# Patient Record
Sex: MALE | Race: WHITE | NOT HISPANIC OR LATINO | Employment: FULL TIME | URBAN - METROPOLITAN AREA
[De-identification: names, ages, dates, MRNs, and addresses within clinical notes are randomized per-mention and may not be internally consistent; named-entity substitution may affect disease eponyms.]

---

## 2017-05-05 ENCOUNTER — HOSPITAL ENCOUNTER (OUTPATIENT)
Dept: GENERAL RADIOLOGY | Facility: CLINIC | Age: 27
Discharge: HOME | End: 2017-05-05
Attending: OTOLARYNGOLOGY | Admitting: OTOLARYNGOLOGY
Payer: COMMERCIAL

## 2017-05-05 DIAGNOSIS — R07.0 THROAT PAIN: ICD-10-CM

## 2017-05-05 PROCEDURE — 74220 X-RAY XM ESOPHAGUS 1CNTRST: CPT

## 2017-05-05 PROCEDURE — 25500045 ZZH RX 255: Performed by: OTOLARYNGOLOGY

## 2017-05-05 RX ADMIN — ANTACID/ANTIFLATULENT 4 G: 380; 550; 10; 10 GRANULE, EFFERVESCENT ORAL at 09:29

## 2020-01-29 ENCOUNTER — NURSE TRIAGE (OUTPATIENT)
Dept: NURSING | Facility: CLINIC | Age: 30
End: 2020-01-29

## 2020-01-29 ENCOUNTER — HOSPITAL ENCOUNTER (EMERGENCY)
Facility: CLINIC | Age: 30
Discharge: HOME | End: 2020-01-30
Attending: EMERGENCY MEDICINE | Admitting: EMERGENCY MEDICINE
Payer: COMMERCIAL

## 2020-01-29 ENCOUNTER — APPOINTMENT (OUTPATIENT)
Dept: MRI IMAGING | Facility: CLINIC | Age: 30
End: 2020-01-29
Attending: EMERGENCY MEDICINE
Payer: COMMERCIAL

## 2020-01-29 VITALS
DIASTOLIC BLOOD PRESSURE: 88 MMHG | OXYGEN SATURATION: 100 % | TEMPERATURE: 97.2 F | HEART RATE: 58 BPM | SYSTOLIC BLOOD PRESSURE: 139 MMHG | RESPIRATION RATE: 16 BRPM

## 2020-01-29 DIAGNOSIS — G43.109 MIGRAINE WITH AURA AND WITHOUT STATUS MIGRAINOSUS, NOT INTRACTABLE: ICD-10-CM

## 2020-01-29 LAB
ANION GAP SERPL CALCULATED.3IONS-SCNC: 2 MMOL/L (ref 3–14)
BASOPHILS # BLD AUTO: 0 10E9/L (ref 0–0.2)
BASOPHILS NFR BLD AUTO: 0.4 %
BUN SERPL-MCNC: 15 MG/DL (ref 7–30)
CALCIUM SERPL-MCNC: 9.6 MG/DL (ref 8.5–10.1)
CHLORIDE SERPL-SCNC: 106 MMOL/L (ref 94–109)
CO2 SERPL-SCNC: 30 MMOL/L (ref 20–32)
CREAT SERPL-MCNC: 1.03 MG/DL (ref 0.66–1.25)
DIFFERENTIAL METHOD BLD: NORMAL
EOSINOPHIL # BLD AUTO: 0.1 10E9/L (ref 0–0.7)
EOSINOPHIL NFR BLD AUTO: 0.9 %
ERYTHROCYTE [DISTWIDTH] IN BLOOD BY AUTOMATED COUNT: 11.9 % (ref 10–15)
GFR SERPL CREATININE-BSD FRML MDRD: >90 ML/MIN/{1.73_M2}
GLUCOSE SERPL-MCNC: 93 MG/DL (ref 70–99)
HCT VFR BLD AUTO: 45.6 % (ref 40–53)
HGB BLD-MCNC: 15.5 G/DL (ref 13.3–17.7)
IMM GRANULOCYTES # BLD: 0 10E9/L (ref 0–0.4)
IMM GRANULOCYTES NFR BLD: 0.1 %
LYMPHOCYTES # BLD AUTO: 1.6 10E9/L (ref 0.8–5.3)
LYMPHOCYTES NFR BLD AUTO: 22.5 %
MCH RBC QN AUTO: 28.8 PG (ref 26.5–33)
MCHC RBC AUTO-ENTMCNC: 34 G/DL (ref 31.5–36.5)
MCV RBC AUTO: 85 FL (ref 78–100)
MONOCYTES # BLD AUTO: 0.6 10E9/L (ref 0–1.3)
MONOCYTES NFR BLD AUTO: 9.3 %
NEUTROPHILS # BLD AUTO: 4.6 10E9/L (ref 1.6–8.3)
NEUTROPHILS NFR BLD AUTO: 66.8 %
NRBC # BLD AUTO: 0 10*3/UL
NRBC BLD AUTO-RTO: 0 /100
PLATELET # BLD AUTO: 302 10E9/L (ref 150–450)
POTASSIUM SERPL-SCNC: 3.8 MMOL/L (ref 3.4–5.3)
RBC # BLD AUTO: 5.38 10E12/L (ref 4.4–5.9)
SODIUM SERPL-SCNC: 138 MMOL/L (ref 133–144)
WBC # BLD AUTO: 6.9 10E9/L (ref 4–11)

## 2020-01-29 PROCEDURE — 99285 EMERGENCY DEPT VISIT HI MDM: CPT | Mod: 25

## 2020-01-29 PROCEDURE — A9585 GADOBUTROL INJECTION: HCPCS | Performed by: EMERGENCY MEDICINE

## 2020-01-29 PROCEDURE — 70553 MRI BRAIN STEM W/O & W/DYE: CPT

## 2020-01-29 PROCEDURE — 80048 BASIC METABOLIC PNL TOTAL CA: CPT | Performed by: EMERGENCY MEDICINE

## 2020-01-29 PROCEDURE — 85025 COMPLETE CBC W/AUTO DIFF WBC: CPT | Performed by: EMERGENCY MEDICINE

## 2020-01-29 PROCEDURE — 25500064 ZZH RX 255 OP 636: Performed by: EMERGENCY MEDICINE

## 2020-01-29 RX ORDER — GADOBUTROL 604.72 MG/ML
10 INJECTION INTRAVENOUS ONCE
Status: COMPLETED | OUTPATIENT
Start: 2020-01-29 | End: 2020-01-30

## 2020-01-29 ASSESSMENT — ENCOUNTER SYMPTOMS
SPEECH DIFFICULTY: 0
NAUSEA: 0
NUMBNESS: 0
VOMITING: 0
HEADACHES: 1

## 2020-01-29 NOTE — ED AVS SNAPSHOT
[facility] Emergency Department  [address]  [facility] [address]  Phone:  [phone]  Fax:  [phone]                                    Kannan Grijalva   MRN: [id number]    Department:  [facility] Emergency Department   Date of Visit:  1/29/2020           After Visit Summary Signature Page    I have received my discharge instructions, and my questions have been answered. I have discussed any challenges I see with this plan with the nurse or doctor.    ..........................................................................................................................................  Patient/Patient Representative Signature      ..........................................................................................................................................  Patient Representative Print Name and Relationship to Patient    ..................................................               ................................................  Date                                   Time    ..........................................................................................................................................  Reviewed by Signature/Title    ...................................................              ..............................................  Date                                               Time          22EPIC Rev 08/18

## 2020-01-30 PROCEDURE — A9585 GADOBUTROL INJECTION: HCPCS | Performed by: EMERGENCY MEDICINE

## 2020-01-30 PROCEDURE — 25500064 ZZH RX 255 OP 636: Performed by: EMERGENCY MEDICINE

## 2020-01-30 RX ADMIN — GADOBUTROL 8 ML: 604.72 INJECTION INTRAVENOUS at 00:19

## 2020-01-30 NOTE — TELEPHONE ENCOUNTER
"\"I had tunnel vision on my right eye when driving home\".  That has cleared, but then when trying to read when he got home he had trouble reading 10-15 minutes, then that cleared.    Reviewed sxs to call 911.    Reason for Disposition    Patient sounds very sick or weak to the triager    Additional Information    Negative: Weakness of the face, arm or leg on one side of the body    Negative: Followed getting substance in the eye    Negative: Foreign body or object is or was lodged in the eye    Negative: Followed an eye injury    Negative: Followed sun lamp or sun exposure (UV keratitis)    Negative: Yellow or green discharge (pus) in the eye    Negative: Pregnant    Negative: Postpartum    Negative: Complete loss of vision in 1 or both eyes    Negative: Severe eye pain    Negative: Severe headache    Negative: Double vision    Negative: [1] Blurred vision or visual changes AND [2] present now AND [3] sudden onset or new (e.g., minutes, hours, days)  (Exception: seeing floaters / black specks OR previously diagnosed migraine headaches with same symptoms)    Protocols used: VISION LOSS OR CHANGE-SAROJ-    Yulissa Estevez RN  [facility]      "

## 2020-01-30 NOTE — ED TRIAGE NOTES
Pt in to ER with C/O blurred vision headache and confusion onset at 1615. Pt reports sx resolved around 1730. Pt A&Ox4 ABCD's intact,  strength equal on exam in triage. Pt reports at the time of onset of sx he was having difficulty composing an e-mail. Sx are resolved at this time. Speech clear. Pt denies hx of migraines in the past. Pt denies weakness

## 2020-01-30 NOTE — ED PROVIDER NOTES
"  History     Chief Complaint:  Eye Problem and Headache    HPI   Kannan Grijalva is a 29 year old male who presents for evaluation of a headache and an eye problem. The patient reports seeing a dot in his left visual field while driving around 1615. He states this progressively worsened until he lost all peripheral vision on his left side for about 10 minutes before slowly subsiding. About 15 minutes after the symptoms subsided, he noted he was composing an e-mail and was unable to read or sound out words, which lasted about 15 minutes. He states his vision was about 75% back at this point and was still able to ambulate. He could not tell if he was slurring his speech, but his wife thought that he sounded normal over the phone. The patient also notes the visual symptoms subsided around 1730, but then he developed a mild headache shortly after. The patient states his left hand \"felt weird,\" but does not describe it has numbness or tingling. No nausea or vomiting. The patient does not frequently get headaches and denies a history of migraines. No drug use. He endorses stress at work recently and notes he was unable to eat throughout the day due to his busy schedule. The patient also indicates he decreased sleep as he has a 5 month old child at home.    Allergies:  NKDA     Medications:    The patient is not currently taking any prescribed medications.      Past Medical History:    Hernia     Past Surgical History:    Septoplasty   Hernia repair  Subtalar arthroereisis     Family History:    Father: hyperlipidemia   Sister: hypertension     Social History:  The patient was accompanied to the ED by his wife.  Smoking Status: Never Smoker  Smokeless Tobacco: Never Used  Alcohol Use: Positive  Marital Status:   [2]     Review of Systems   Eyes: Positive for visual disturbance.   Gastrointestinal: Negative for nausea and vomiting.   Neurological: Positive for headaches. Negative for speech difficulty and numbness. "        Reading difficulty  Denies tingling   All other systems reviewed and are negative.      Physical Exam     Patient Vitals for the past 24 hrs:   BP Temp Pulse Resp SpO2   01/29/20 2240 -- -- -- -- 100 %   01/29/20 2239 139/88 -- 58 -- --   01/29/20 2109 -- -- -- -- 97 %   01/29/20 2108 -- -- 87 -- --   01/29/20 1940 (!) 141/103 97.2  F (36.2  C) 101 16 --        Physical Exam  Nursing note and vitals reviewed.  Constitutional: Cooperative.   HENT:   Mouth/Throat: Moist mucous membranes.   Eyes: EOMI, nonicteric sclera  Cardiovascular: Normal rate, regular rhythm, no murmurs, rubs, or gallops  Pulmonary/Chest: Effort normal and breath sounds normal. No respiratory distress. No wheezes. No rales.   Abdominal: Soft. Nontender, nondistended, no guarding or rigidity. BS present.   Musculoskeletal: Normal range of motion.   Neurological: Alert. Moves all extremities spontaneously.     CN's II-XII intact. 5/5 BUE and BLE strength. PERRL    EOMI without nystagmus.      Sensation intact to light touch. Negative pronator drift.    Finger to nose intact.   Skin: Skin is warm and dry. No rash noted.   Psychiatric: Normal mood and affect.       Emergency Department Course     Imaging:  Radiographic findings were communicated with the patient who voiced understanding of the findings.    MR Brain w/o & w Contrast  IMPRESSION:  1.  Normal head MRI. Reading per radiology.    Laboratory:  CBC: WBC: 6.9, HGB 15.5, PLT: 302    BMP: Anion Gap 2 (L), o/w WNL (Creatinine: 1.03)    Emergency Department Course:  Past medical records, nursing notes, and vitals reviewed.  2047: I performed an exam of the patient and obtained history, as documented above.    The patient was sent for a MR Brain while in the emergency department, results above.      IV was inserted and blood was drawn for laboratory testing, results above.     2116 I spoke with Dr. Smith, Neurology, regarding the patient.     0105 I rechecked and updated the  patient.     Findings and plan explained to the Patient. Patient discharged home with instructions regarding supportive care, medications, and reasons to return. The importance of close follow-up was reviewed.     I personally reviewed the laboratory and imaging results with the Patient and answered all related questions prior to admission.     Impression & Plan      Medical Decision Making:  Patient presents with complaints of vision changes, difficulty reading, and mild headache.  He has not had these symptoms in the past.  His neurologic exam here is normal.  Given he has never had these symptoms before, MRI was obtained to evaluate for stroke, mass, MS, versus other process.  MRI is read as normal which is reassuring.  I suspicious that patient symptoms represented migraine with aura and I discussed with on-call neurology who agreed with this assessment.  Notably, the slow evolution of first the vision changes, and then those improving while patient developed problems reading, is somewhat classic of an aura.  He did not require any intervention for this here and remained asymptomatic during his time the emergency department.  With normal exam and MRI, I believe patient is safe/stable for discharge home.  He does not have a primary care provider and I recommended that he obtain 1 and he was given the follow-up information for [facility]. He is in stable condition at the time of discharge, indications for return to the ED were discussed as well as follow up. All questions were answered and he is in agreement with the plan.      Diagnosis:    ICD-10-CM    1. Migraine with aura and without status migrainosus, not intractable G43.109        Disposition:  discharged to home    I, Lesa Meeks, am serving as a scribe on 1/30/2020 at 12:50 AM to personally document services performed by Sonido Pickett MD based on my observations and the provider's statements to me.      Lesa Meeks  1/29/2020    [facility] EMERGENCY DEPARTMENT       Sonido Pickett MD  01/30/20 0655

## 2020-12-27 ENCOUNTER — HEALTH MAINTENANCE LETTER (OUTPATIENT)
Age: 30
End: 2020-12-27

## 2021-04-19 ENCOUNTER — OFFICE VISIT (OUTPATIENT)
Dept: FAMILY MEDICINE | Facility: CLINIC | Age: 31
End: 2021-04-19
Payer: COMMERCIAL

## 2021-04-19 VITALS
HEIGHT: 69 IN | BODY MASS INDEX: 28.14 KG/M2 | DIASTOLIC BLOOD PRESSURE: 80 MMHG | TEMPERATURE: 98.1 F | WEIGHT: 190 LBS | SYSTOLIC BLOOD PRESSURE: 140 MMHG | OXYGEN SATURATION: 100 % | HEART RATE: 77 BPM

## 2021-04-19 DIAGNOSIS — M10.9 ACUTE GOUTY ARTHRITIS: Primary | ICD-10-CM

## 2021-04-19 LAB
BASOPHILS # BLD AUTO: 0 10E9/L (ref 0–0.2)
BASOPHILS NFR BLD AUTO: 0.2 %
DIFFERENTIAL METHOD BLD: NORMAL
EOSINOPHIL # BLD AUTO: 0.1 10E9/L (ref 0–0.7)
EOSINOPHIL NFR BLD AUTO: 0.8 %
ERYTHROCYTE [DISTWIDTH] IN BLOOD BY AUTOMATED COUNT: 11.9 % (ref 10–15)
HCT VFR BLD AUTO: 44.3 % (ref 40–53)
HGB BLD-MCNC: 14.9 G/DL (ref 13.3–17.7)
LYMPHOCYTES # BLD AUTO: 1.7 10E9/L (ref 0.8–5.3)
LYMPHOCYTES NFR BLD AUTO: 19.7 %
MCH RBC QN AUTO: 29.4 PG (ref 26.5–33)
MCHC RBC AUTO-ENTMCNC: 33.6 G/DL (ref 31.5–36.5)
MCV RBC AUTO: 88 FL (ref 78–100)
MONOCYTES # BLD AUTO: 1 10E9/L (ref 0–1.3)
MONOCYTES NFR BLD AUTO: 11.3 %
NEUTROPHILS # BLD AUTO: 6 10E9/L (ref 1.6–8.3)
NEUTROPHILS NFR BLD AUTO: 68 %
PLATELET # BLD AUTO: 325 10E9/L (ref 150–450)
RBC # BLD AUTO: 5.06 10E12/L (ref 4.4–5.9)
WBC # BLD AUTO: 8.8 10E9/L (ref 4–11)

## 2021-04-19 PROCEDURE — 85025 COMPLETE CBC W/AUTO DIFF WBC: CPT | Performed by: FAMILY MEDICINE

## 2021-04-19 PROCEDURE — 84550 ASSAY OF BLOOD/URIC ACID: CPT | Performed by: FAMILY MEDICINE

## 2021-04-19 PROCEDURE — 99203 OFFICE O/P NEW LOW 30 MIN: CPT | Performed by: FAMILY MEDICINE

## 2021-04-19 PROCEDURE — 36415 COLL VENOUS BLD VENIPUNCTURE: CPT | Performed by: FAMILY MEDICINE

## 2021-04-19 RX ORDER — INDOMETHACIN 50 MG/1
50 CAPSULE ORAL 2 TIMES DAILY WITH MEALS
Qty: 60 CAPSULE | Refills: 1 | Status: SHIPPED | OUTPATIENT
Start: 2021-04-19

## 2021-04-19 ASSESSMENT — MIFFLIN-ST. JEOR: SCORE: 1807.21

## 2021-04-19 ASSESSMENT — ENCOUNTER SYMPTOMS
FEVER: 0
ARTHRALGIAS: 1
JOINT SWELLING: 1
NEUROLOGICAL NEGATIVE: 1
CONSTITUTIONAL NEGATIVE: 1

## 2021-04-19 NOTE — PATIENT INSTRUCTIONS
Patient Education     Gout Diet  Gout is a painful condition caused by an excess of uric acid, a waste product made by the body. Uric acid forms crystals that collect in the joints. The immune response to these crystals brings on symptoms of joint pain and swelling. This is called a gout attack. Often, medications and diet changes are combined to manage gout. Below are some guidelines for changing your diet to help you manage gout and prevent attacks. Your healthcare provider will help you determine the best eating plan for you.  Eating to manage gout  Weight loss for those who are overweight may help reduce gout attacks.  Eat less of these foods  Eating too many foods containing purines may raise the levels of uric acid in your body. This raises your risk for a gout attack. Try to limit these foods and drinks:    Alcohol, such as beer and red wine. You may be told to avoid alcohol completely.    Soft drinks that contain sugar or high fructose corn syrup    Certain fish, including anchovies, sardines, fish eggs, and herring    Shellfish    Certain meats, such as red meat, hot dogs, luncheon meats, and turkey    Organ meats, such as liver, kidneys, and sweetbreads    Legumes, such as dried beans and peas    Other high fat foods such as gravy, whole milk, and high fat cheeses    Vegetables such as asparagus, cauliflower, spinach, and mushrooms used to be thought to contribute to an increased risk for a gout attack, but recent studies show that high purine vegetables don't increase the risk for a gout attack.  Eat more of these foods  Other foods may be helpful for people with gout. Add some of these foods to your diet:    Cherries contain chemicals that may lower uric acid.    Omega fatty acids. These are found in some fatty fish such as salmon, certain oils (flax, olive, or nut), and nuts themselves. Omega fatty acids may help prevent inflammation due to gout.    Dairy products that are low-fat or fat-free, such as  cheese and yogurt    Complex carbohydrate foods, including whole grains, brown rice, oats, and beans    Coffee, in moderation    Water, approximately 64 ounces per day  Follow-up care  Follow up with your healthcare provider as advised.  When to seek medical advice  Call your healthcare provider right away if any of these occur:    Return of gout symptoms, usually at night:    Severe pain, swelling, and heat in a joint, especially the base of the big toe    Affected joint is hard to move    Skin of the affected joint is purple or red    Fever of 100.4 F (38 C) or higher    Pain that doesn't get better even with prescribed medicine   [employer] last reviewed this educational content on 6/1/2018 2000-2021 [employer]. All rights reserved. This information is not intended as a substitute for professional medical care. Always follow your healthcare professional's instructions.

## 2021-04-19 NOTE — PROGRESS NOTES
"    Assessment and Plan    (M10.9) Acute gouty arthritis  (primary encounter diagnosis)  Comment: does appear pretty classic.  Advised that if this reoocurs we may need to discuss daily medication.  Plan: Uric acid, CBC with platelets and differential,        indomethacin (INDOCIN) 50 MG capsule              RTC in 1y for CPE    Celestine Beckwith MD      Michael Brunner is a 30 year old who presents for the following health issues     HPI   Right foot great toe pain, swelling and redness, no injury.    Pain for about 1-1/2 weeks, worse in the last 2 night, difficulty swelling.  Looking bruised and is very swollen.  No known injury, but notes that he just started running about 1m ago.  Has not been running since this started.   Ibuprofen has been helpful.  Will use 400mg BID prn.  Believes his father has \"gout\" but isn't sure if this is an actual diagnosis or just something he complains about.    No regular meds.  No previous foot injury.  Admits that he does like his steaks and beer.      Review of Systems   Constitutional: Negative.  Negative for fever.   Musculoskeletal: Positive for arthralgias and joint swelling.   Neurological: Negative.             Objective    BP (!) 140/80   Pulse 77   Temp 98.1  F (36.7  C) (Oral)   Ht 1.753 m (5' 9\")   Wt 86.2 kg (190 lb)   SpO2 100%   BMI 28.06 kg/m    Body mass index is 28.06 kg/m .  Physical Exam  Vitals signs reviewed.   Eyes:      Conjunctiva/sclera: Conjunctivae normal.   Cardiovascular:      Rate and Rhythm: Normal rate and regular rhythm.      Heart sounds: Normal heart sounds.   Pulmonary:      Effort: Pulmonary effort is normal.      Breath sounds: Normal breath sounds.   Musculoskeletal:      Right foot: Tenderness and swelling present.      Comments: Marked swelling and erythema in R first IP and MTP, tender   Skin:     General: Skin is warm and dry.   Neurological:      Mental Status: He is alert and oriented to person, place, and time.      "

## 2021-04-20 LAB — URATE SERPL-MCNC: 7.6 MG/DL (ref 3.5–7.2)

## 2021-10-09 ENCOUNTER — HEALTH MAINTENANCE LETTER (OUTPATIENT)
Age: 31
End: 2021-10-09

## 2022-01-12 ENCOUNTER — OFFICE VISIT (OUTPATIENT)
Dept: FAMILY MEDICINE | Facility: CLINIC | Age: 32
End: 2022-01-12
Payer: COMMERCIAL

## 2022-01-12 VITALS
OXYGEN SATURATION: 100 % | TEMPERATURE: 98.5 F | BODY MASS INDEX: 28.75 KG/M2 | RESPIRATION RATE: 18 BRPM | WEIGHT: 194.1 LBS | HEART RATE: 68 BPM | SYSTOLIC BLOOD PRESSURE: 125 MMHG | DIASTOLIC BLOOD PRESSURE: 85 MMHG | HEIGHT: 69 IN

## 2022-01-12 DIAGNOSIS — L98.9 SKIN LESION: Primary | ICD-10-CM

## 2022-01-12 DIAGNOSIS — Z98.890 HISTORY OF RHINOPLASTY: ICD-10-CM

## 2022-01-12 PROCEDURE — 88305 TISSUE EXAM BY PATHOLOGIST: CPT | Performed by: DERMATOLOGY

## 2022-01-12 PROCEDURE — 11106 INCAL BX SKN SINGLE LES: CPT | Performed by: FAMILY MEDICINE

## 2022-01-12 PROCEDURE — 99207 PR NO BILLABLE SERVICE THIS VISIT: CPT | Mod: 25 | Performed by: FAMILY MEDICINE

## 2022-01-12 ASSESSMENT — MIFFLIN-ST. JEOR: SCORE: 1820.81

## 2022-01-12 NOTE — PROGRESS NOTES
"      Michael Roldan is a 31 year old who presents for the following health issues mole on left upper back , tip is traumatized, not consistently pigmented     HPI     Concern - Mole  Onset: not sure,may be around a yr  Description:  A mole on left back side,umbness around the mole,family history of Melonoma  Intensity: mild  Progression of Symptoms:  same  Accompanying Signs & Symptoms: numbness  Previous history of similar problem: none  Precipitating factors:        Worsened by: none  Alleviating factors:        Improved by: none  Therapies tried and outcome: None   whos had left shoulder and scapular numbness issue,       Review of Systems   Constitutional, HEENT, cardiovascular, pulmonary, GI, , musculoskeletal, neuro, skin, endocrine and psych systems are negative, except as otherwise noted. He likes to play hockey, no special history of sun exposure. He has a relative who had melanoma       Objective    /85 (BP Location: Right arm, Patient Position: Chair, Cuff Size: Adult Large)   Pulse 68   Temp 98.5  F (36.9  C) (Oral)   Resp 18   Ht 1.753 m (5' 9\")   Wt 88 kg (194 lb 1.6 oz)   SpO2 100%   BMI 28.66 kg/m      Physical Exam   GENERAL: healthy, alert and no distress  EYES: Eyes grossly normal to inspection, PERRL and conjunctivae and sclerae normal  HENT: ear canals and TM's normal, nose and mouth without ulcers or lesions tms normal   NECK: no adenopathy, no asymmetry, masses, or scars and thyroid normal to palpation  RESP: lungs clear to auscultation - no rales, rhonchi or wheezes  CV: regular rate and rhythm, normal S1 S2, no S3 or S4, no murmur, click or rub, no peripheral edema and peripheral pulses strong  ABDOMEN: soft, nontender, no hepatosplenomegaly, no masses and bowel sounds normal    PSYCH: mentation appears normal, affect normal/bright  He agrees to biopsy the lesion offered derm referral vs biopsy then refer as needed. Appearance is papilloma vs nevus   With one " percent xylocaine with epinephrine the lesion was incised and removed and placed in formalin   Wound doesn['t gap will permit secondary closure with topical antibiotic to to possible melanin      (L98.9)  skin lesion on left upper  back   (primary encounter diagnosis) tan base with dark polypoid tip, trauma vs melanin   Comment: nevus vs papilloma with trauma   Plan: [place] INCISIONAL BIOPSY OF SKIN, FIRST/SINGLE         LESION, Surgical Pathology Exam            (Z98.890) History of rhinoplasty  Comment: gives him some issues with nasal congestion,   Plan:

## 2022-01-17 LAB
PATH REPORT.COMMENTS IMP SPEC: NORMAL
PATH REPORT.COMMENTS IMP SPEC: NORMAL
PATH REPORT.FINAL DX SPEC: NORMAL
PATH REPORT.GROSS SPEC: NORMAL
PATH REPORT.MICROSCOPIC SPEC OTHER STN: NORMAL
PATH REPORT.RELEVANT HX SPEC: NORMAL
PHOTO IMAGE: NORMAL

## 2022-01-29 ENCOUNTER — HEALTH MAINTENANCE LETTER (OUTPATIENT)
Age: 32
End: 2022-01-29

## 2022-08-25 ENCOUNTER — MYC MEDICAL ADVICE (OUTPATIENT)
Dept: FAMILY MEDICINE | Facility: CLINIC | Age: 32
End: 2022-08-25

## 2022-09-06 ENCOUNTER — E-VISIT (OUTPATIENT)
Dept: FAMILY MEDICINE | Facility: CLINIC | Age: 32
End: 2022-09-06
Payer: COMMERCIAL

## 2022-09-06 DIAGNOSIS — M10.9 ACUTE GOUTY ARTHRITIS: Primary | ICD-10-CM

## 2022-09-06 PROCEDURE — 99421 OL DIG E/M SVC 5-10 MIN: CPT | Performed by: FAMILY MEDICINE

## 2022-09-06 RX ORDER — INDOMETHACIN 50 MG/1
50 CAPSULE ORAL 2 TIMES DAILY WITH MEALS
Qty: 20 CAPSULE | Refills: 1 | Status: SHIPPED | OUTPATIENT
Start: 2022-09-06 | End: 2023-11-16

## 2022-09-17 ENCOUNTER — HEALTH MAINTENANCE LETTER (OUTPATIENT)
Age: 32
End: 2022-09-17

## 2022-09-19 ENCOUNTER — OFFICE VISIT (OUTPATIENT)
Dept: FAMILY MEDICINE | Facility: CLINIC | Age: 32
End: 2022-09-19
Payer: COMMERCIAL

## 2022-09-19 VITALS
BODY MASS INDEX: 26.73 KG/M2 | TEMPERATURE: 97.8 F | OXYGEN SATURATION: 100 % | SYSTOLIC BLOOD PRESSURE: 138 MMHG | DIASTOLIC BLOOD PRESSURE: 89 MMHG | WEIGHT: 181 LBS | HEART RATE: 62 BPM

## 2022-09-19 DIAGNOSIS — J02.9 SORE THROAT: Primary | ICD-10-CM

## 2022-09-19 LAB
DEPRECATED S PYO AG THROAT QL EIA: NEGATIVE
GROUP A STREP BY PCR: NOT DETECTED

## 2022-09-19 PROCEDURE — 87651 STREP A DNA AMP PROBE: CPT | Performed by: PHYSICIAN ASSISTANT

## 2022-09-19 PROCEDURE — 99213 OFFICE O/P EST LOW 20 MIN: CPT

## 2022-09-19 ASSESSMENT — ENCOUNTER SYMPTOMS
RESPIRATORY NEGATIVE: 1
CARDIOVASCULAR NEGATIVE: 1
SORE THROAT: 1
CONSTITUTIONAL NEGATIVE: 1

## 2022-09-19 NOTE — PROGRESS NOTES
Assessment & Plan     Sore throat  - Streptococcus A Rapid Scr w Reflx to PCR    Strep (-)  Increase fluids with water, Pedialyte, Gatorade, or rehydrating beverages. Alternate Tylenol and Ibuprofen as needed for aches, pains or fever. If needed, follow soft food diet. Rest as much as possible. Use OTC cough and cold medication. Run humidifier at night. Gargle with hot salty water. Have warm tea or water with honey. Follow up in clinic if symptoms persist or worsen. This usually can last 7-10 days.     Return if symptoms worsen or fail to improve, for Follow up.    Subjective     Jamar is a 31 year old male who presents to clinic today for the following health issues:  Chief Complaint   Patient presents with     Pharyngitis     Expose to strep  scratchy throat       Jamar presents with reports of scratchy throat and exposure to strep. His wife and 3 children tested positive for strep. He denies fever or other symptoms.           Review of Systems   Constitutional: Negative.    HENT: Positive for sore throat.    Respiratory: Negative.    Cardiovascular: Negative.            Objective    /89   Pulse 62   Temp 97.8  F (36.6  C) (Tympanic)   Wt 82.1 kg (181 lb)   SpO2 100%   BMI 26.73 kg/m    Physical Exam  Constitutional:       Appearance: Normal appearance.   HENT:      Mouth/Throat:      Mouth: Mucous membranes are moist.      Pharynx: Oropharynx is clear.   Eyes:      Extraocular Movements: Extraocular movements intact.      Conjunctiva/sclera: Conjunctivae normal.      Pupils: Pupils are equal, round, and reactive to light.   Musculoskeletal:      Cervical back: Normal range of motion and neck supple.   Lymphadenopathy:      Cervical: No cervical adenopathy.   Neurological:      Mental Status: He is alert.              Agustín Alfonso PA-C

## 2022-12-01 ENCOUNTER — TELEPHONE (OUTPATIENT)
Dept: FAMILY MEDICINE | Facility: CLINIC | Age: 32
End: 2022-12-01

## 2022-12-01 NOTE — TELEPHONE ENCOUNTER
Patient Quality Outreach    Patient is due for the following:   Physical Preventive Adult Physical      Topic Date Due     Diptheria Tetanus Pertussis (DTAP/TDAP/TD) Vaccine (6 - Tdap) 04/18/2003     COVID-19 Vaccine (3 - Booster for Pfizer series) 07/06/2021     Flu Vaccine (1) 09/01/2022       Next Steps:   Schedule a Adult Preventative    Type of outreach:    Sent [employer] message.      Questions for provider review:    None     Don Bunn MA

## 2022-12-01 NOTE — LETTER
[facility]  [address] [facility] [id number] [phone]       December 16, 2022    Kannan Grijalva  [facility] [place] [id number]    Dear Jamar,    We care about your health and have reviewed your health plan and are making recommendations based on this review, to optimize your health.    You are in particular need of attention regarding:  -Wellness (Physical) Visit     We are recommending that you:  -schedule a WELLNESS (Physical) APPOINTMENT with me.   I will check fasting labs the same day - nothing to eat except water and meds for 8-10 hours prior.    It is flu season, please schedule a nurse only appointment to recieve your flu shot.       In addition, here is a list of due or overdue Health Maintenance reminders.    Health Maintenance Due   Topic Date Due     Yearly Preventive Visit  Never done     Discuss Advance Care Planning  Never done     Diptheria Tetanus Pertussis (DTAP/TDAP/TD) Vaccine (6 - Tdap) 04/18/2003     HIV Screening  Never done     Hepatitis C Screening  Never done     COVID-19 Vaccine (3 - Booster for Pfizer series) 07/06/2021     PHQ-2 (once per calendar year)  Never done     Flu Vaccine (1) 09/01/2022     ANNUAL REVIEW OF HM ORDERS  01/12/2023       To address the above recommendations, we encourage you to contact us at [phone], via [employer] or by contacting Central Scheduling toll free at [phone] 24 hours a day. They will assist you with finding the most convenient time and location.    Thank you for trusting [facility] and we appreciate the opportunity to serve you.  We look forward to supporting your healthcare needs in the future.    Healthy Regards,    Your [facility] Team

## 2022-12-16 NOTE — TELEPHONE ENCOUNTER
Patient Quality Outreach    Patient is due for the following:   Physical Preventive Adult Physical      Topic Date Due     Diptheria Tetanus Pertussis (DTAP/TDAP/TD) Vaccine (6 - Tdap) 04/18/2003     COVID-19 Vaccine (3 - Booster for Pfizer series) 07/06/2021     Flu Vaccine (1) 09/01/2022       Next Steps:   Schedule a Adult Preventative    Type of outreach:    Sent letter.    Next Steps:  Reach out within 90 days via Letter.    Max number of attempts reached: Yes. Will try again in 90 days if patient still on fail list.    Questions for provider review:    None     Don Bunn MA

## 2023-05-06 ENCOUNTER — HEALTH MAINTENANCE LETTER (OUTPATIENT)
Age: 33
End: 2023-05-06

## 2023-09-11 ENCOUNTER — OFFICE VISIT (OUTPATIENT)
Dept: PEDIATRICS | Facility: CLINIC | Age: 33
End: 2023-09-11
Payer: COMMERCIAL

## 2023-09-11 VITALS
TEMPERATURE: 98 F | RESPIRATION RATE: 18 BRPM | SYSTOLIC BLOOD PRESSURE: 130 MMHG | WEIGHT: 185.1 LBS | DIASTOLIC BLOOD PRESSURE: 80 MMHG | HEART RATE: 66 BPM | BODY MASS INDEX: 27.33 KG/M2 | OXYGEN SATURATION: 100 %

## 2023-09-11 DIAGNOSIS — R59.1 LYMPHADENOPATHY: ICD-10-CM

## 2023-09-11 DIAGNOSIS — R03.0 PREHYPERTENSION: ICD-10-CM

## 2023-09-11 DIAGNOSIS — H66.002 NON-RECURRENT ACUTE SUPPURATIVE OTITIS MEDIA OF LEFT EAR WITHOUT SPONTANEOUS RUPTURE OF TYMPANIC MEMBRANE: Primary | ICD-10-CM

## 2023-09-11 PROCEDURE — 99213 OFFICE O/P EST LOW 20 MIN: CPT | Performed by: INTERNAL MEDICINE

## 2023-09-11 ASSESSMENT — PAIN SCALES - GENERAL: PAINLEVEL: MILD PAIN (3)

## 2023-09-11 NOTE — PROGRESS NOTES
Assessment & Plan       ICD-10-CM    1. Non-recurrent acute suppurative otitis media of left ear without spontaneous rupture of tympanic membrane  H66.002     Resolved after full course of augmentin      2. Lymphadenopathy  R59.1     Reactive, bilateral cervical and post-auricular.  Monitor - expectant care.      3. Prehypertension  R03.0     Lifestyle modifications advised and monitor - recommend establishing care and getting annual physicals               There are no Patient Instructions on file for this visit.    Jose Mcnamara MD  [facility] MARITA Roldan is a 32 year old, presenting for the following health issues:  Derm Problem (X 10 days bumps behind ears, spread to neck and head feels different. Noticed swelling and redness and tender to the touch pain of bumps behind ears. Had a jaw pain randomly recently unsure if related. Less pain today, is taking tylenol and was prescribed antibiotic recently but patient stated no changes with new medication )      9/11/2023    10:44 AM   Additional Questions   Roomed by NANCY Jerome   Accompanied by SHIRLENE         9/11/2023    10:44 AM   Patient Reported Additional Medications   Patient reports taking the following new medications amoxicillin-clavulanate (AUGMENTIN) 875-125 MG tablet       History of Present Illness       Reason for visit:  Pain in ears and head  Symptom onset:  3-7 days ago  Symptom intensity:  Moderate  Symptom progression:  Staying the same  Had these symptoms before:  No  What makes it worse:  No  What makes it better:  No    He eats 2-3 servings of fruits and vegetables daily.He consumes 0 sweetened beverage(s) daily.He exercises with enough effort to increase his heart rate 30 to 60 minutes per day.  He exercises with enough effort to increase his heart rate 4 days per week.   He is taking medications regularly.     Ear pain x 2 weeks - left worse than right.  Felt like ear infection.  1 week later had bone pain behind  ear - felt swollen.  Treated for ear infection - augmentin  Now has bumps that appeared.  Had jaw pain last night which is gone.            Review of Systems   Constitutional, HEENT, cardiovascular, pulmonary, gi and gu systems are negative, except as otherwise noted.      Objective    /80   Pulse 66   Temp 98  F (36.7  C) (Oral)   Resp 18   Wt 84 kg (185 lb 1.6 oz)   SpO2 100%   BMI 27.33 kg/m    Body mass index is 27.33 kg/m .  Physical Exam   GENERAL: healthy, alert and no distress  HENT: ear canals and TM's normal, nose and mouth without ulcers or lesions  NECK: bilateral anterior cervical adenopathy and no asymmetry, masses, or scars

## 2023-11-15 DIAGNOSIS — M10.9 ACUTE GOUTY ARTHRITIS: ICD-10-CM

## 2023-11-16 RX ORDER — INDOMETHACIN 50 MG/1
50 CAPSULE ORAL 2 TIMES DAILY WITH MEALS
Qty: 10 CAPSULE | Refills: 0 | Status: SHIPPED | OUTPATIENT
Start: 2023-11-16

## 2024-07-13 ENCOUNTER — HEALTH MAINTENANCE LETTER (OUTPATIENT)
Age: 34
End: 2024-07-13

## 2025-01-16 ENCOUNTER — E-VISIT (OUTPATIENT)
Dept: URGENT CARE | Facility: CLINIC | Age: 35
End: 2025-01-16
Payer: COMMERCIAL

## 2025-01-16 DIAGNOSIS — B00.1 HERPES LABIALIS: Primary | ICD-10-CM

## 2025-01-16 RX ORDER — VALACYCLOVIR HYDROCHLORIDE 1 G/1
2000 TABLET, FILM COATED ORAL 2 TIMES DAILY
Qty: 4 TABLET | Refills: 0 | Status: SHIPPED | OUTPATIENT
Start: 2025-01-16 | End: 2025-01-17

## 2025-01-16 NOTE — PATIENT INSTRUCTIONS
Hello,    After reviewing your responses, I've been able to diagnose you with coldsores which are common mouth sores caused by infection with the virus herpes.    Based on your responses, I have prescribed valtrex to treat this. Please follow the instructions on the medication. If you experience irritation of your skin, new rash, or any other new symptoms, you should stop using this medication and contact your primary care provider.    If this treatment does not work for you or your sores are worsening instead of improving or do not resolve in 7 days, please plan to follow-up with your primary care provider. They may be able to offer refills for you for future outbreaks as well.    Thanks for choosing?us?as your health care partner,?   ?   TURNER HANSON CNP?   Cold Sores: Care Instructions  Overview  Cold sores are clusters of small blisters on the lip and skin around or inside the mouth. Often the first sign of a cold sore is a spot that tingles, burns, or itches. A blister usually forms within 24 hours. They are sometimes called fever blisters. The skin around the blisters can be red and inflamed. The blisters can break open, weep a clear fluid, and then scab over after a few days. Cold sores often heal in 7 to 10 days with no scar.  Cold sores are caused by the herpes simplex virus. The virus is spread by skin-to-skin contact. That means that if you have a cold sore and kiss another person, that person could get a cold sore too.  This is the same virus that causes some cases of genital herpes. So if you have a cold sore and have oral sex with someone, that person could get a sore in the genital area.  Cold sores will often go away on their own. But if they're painful, ask your doctor about a prescription antiviral medicine. It can relieve pain, help prevent outbreaks, and shorten the healing time.  Follow-up care is a key part of your treatment and safety. Be sure to make and go to all appointments,  and call your doctor if you are having problems. It's also a good idea to know your test results and keep a list of the medicines you take.  How can you care for yourself at home?  Wash your hands often. And try not to touch your cold sores. This will help to avoid spreading the virus to your eyes or genital area or to other people. This is more likely to happen if this is your first cold sore outbreak.  To help relieve pain, try placing a cold, wet towel on the sore. This can also reduce swelling.  If you are just getting a cold sore, try using over-the-counter docosanol (Abreva) cream to reduce symptoms.  If your doctor prescribed antiviral medicine to relieve pain and shorten the healing time, be sure to follow the directions.  Take an over-the-counter pain medicine, such as acetaminophen (Tylenol), ibuprofen (Advil, Motrin), or naproxen (Aleve), as needed. Read and follow all instructions on the label. No one younger than 20 should take aspirin. It has been linked to Reye syndrome, a serious illness.  Do not take two or more pain medicines at the same time unless the doctor told you to. Many pain medicines have acetaminophen, which is Tylenol. Too much acetaminophen (Tylenol) can be harmful.  Avoid citrus fruit, tomatoes, and other foods that contain acid.  Use over-the-counter ointments, such as Anbesol or Orajel, to numb sore areas in the mouth or on the lips.  Do not kiss or have oral sex with anyone while you have a cold sore.  To prevent cold sores in the future  Avoid long exposure of your lips to sunlight. (Wear a hat to help shade your mouth.)  Using lip balm that contains sunscreen may help reduce outbreaks of cold sores.  Do not share towels, razors, silverware, toothbrushes, or other objects with a person who has a cold sore.  For frequent or painful cold sores, try taking an antiviral medicine daily to decrease outbreaks.  When should you call for help?   Call your doctor now or seek immediate  "medical care if:    Your symptoms are painful and you want to try antiviral medicine.     You have signs of infection, such as:  Increased pain, swelling, warmth, or redness.  Red streaks leading from a cold sore.  Pus draining from a cold sore.  A fever.     You have a cold sore and develop eye pain, eye discharge, or any changes in your vision.   Watch closely for changes in your health, and be sure to contact your doctor if:    The cold sore does not heal in 7 to 10 days.     You get cold sores often.   Where can you learn more?  Go to https://www.[employer].net/patiented  Enter R850 in the search box to learn more about \"Cold Sores: Care Instructions.\"  Current as of: July 31, 2024  Content Version: 14.3    2024 [email or web address].   Care instructions adapted under license by your healthcare professional. If you have questions about a medical condition or this instruction, always ask your healthcare professional. [email or web address] disclaims any warranty or liability for your use of this information.    "

## 2025-02-12 ENCOUNTER — MYC REFILL (OUTPATIENT)
Dept: FAMILY MEDICINE | Facility: CLINIC | Age: 35
End: 2025-02-12
Payer: COMMERCIAL

## 2025-02-12 ENCOUNTER — VIRTUAL VISIT (OUTPATIENT)
Dept: INTERNAL MEDICINE | Facility: CLINIC | Age: 35
End: 2025-02-12
Payer: COMMERCIAL

## 2025-02-12 DIAGNOSIS — M10.9 ACUTE GOUTY ARTHRITIS: ICD-10-CM

## 2025-02-12 PROCEDURE — 98006 SYNCH AUDIO-VIDEO EST MOD 30: CPT | Performed by: INTERNAL MEDICINE

## 2025-02-12 RX ORDER — INDOMETHACIN 50 MG/1
50 CAPSULE ORAL 2 TIMES DAILY WITH MEALS
Qty: 60 CAPSULE | Refills: 1 | OUTPATIENT
Start: 2025-02-12

## 2025-02-12 RX ORDER — INDOMETHACIN 50 MG/1
50 CAPSULE ORAL 2 TIMES DAILY WITH MEALS
Qty: 60 CAPSULE | Refills: 1 | Status: SHIPPED | OUTPATIENT
Start: 2025-02-12

## 2025-02-12 NOTE — PROGRESS NOTES
"Jamar is a 34 year old who is being evaluated via a billable video visit.          Assessment & Plan     Acute gouty arthritis  Indomethacin refilled  Discussed triggers for gout like seafood, alcohol, red meat  Advised him to have his uric acid rechecked after his acute flareup is resolved.            Patient needs to schedule an appointment for in person visit and a physical    Subjective   Jamar is a 34 year old, presenting for the following health issues:  Flare and Arthritis    History of Present Illness       Reason for visit:  Gout flare up    He eats 2-3 servings of fruits and vegetables daily.He consumes 0 sweetened beverage(s) daily.He exercises with enough effort to increase his heart rate 30 to 60 minutes per day.  He exercises with enough effort to increase his heart rate 4 days per week.   He is taking medications regularly.       Patient first started having gout episodes in 2021.  Uric acid was 7.2 at that time.    He gets about 2-3 episodes a year.  Last episode was 3 months ago in his foot.    He now has a flareup in his wrist for the last 4 days.  Needs refill on indomethacin.    There is no history of kidney stones.    Multiple family members have osteoarthritis, he is not sure about gout.        Review of Systems  Constitutional, HEENT, cardiovascular, pulmonary, gi and gu systems are negative, except as otherwise noted.      Objective    Vitals - Patient Reported  Weight (Patient Reported): 83.9 kg (185 lb)  Height (Patient Reported): 175.3 cm (5' 9\")  BMI (Based on Pt Reported Ht/Wt): 27.32      Vitals:  No vitals were obtained today due to virtual visit.    Physical Exam   GENERAL: alert and no distress  EYES: Eyes grossly normal to inspection.  No discharge or erythema, or obvious scleral/conjunctival abnormalities.  RESP: No audible wheeze, cough, or visible cyanosis.    SKIN: Visible skin clear. No significant rash, abnormal pigmentation or lesions.  NEURO: Cranial nerves grossly intact.  " Mentation and speech appropriate for age.  PSYCH: Appropriate affect, tone, and pace of words          Video-Visit Details    Type of service:  Video Visit   Originating Location (pt. Location): Home    Distant Location (provider location):  On-site  Platform used for Video Visit: Corby  Signed Electronically by: Amy Rosa MD

## 2025-03-13 NOTE — PROGRESS NOTES
ASSESSMENT & PLAN    Jamar was seen today for pain.    Diagnoses and all orders for this visit:    Right wrist pain  -     XR Wrist Right G/E 3 Views; Future  -     methylPREDNISolone (MEDROL DOSEPAK) 4 MG tablet therapy pack; Follow Package Directions  -     Wrist/Arm/Hand Bracing Supplies Order Wrist Brace; Right; non-thumb spica  -     Hand Therapy Referral; Future    Right wrist tendonitis  -     methylPREDNISolone (MEDROL DOSEPAK) 4 MG tablet therapy pack; Follow Package Directions  -     Wrist/Arm/Hand Bracing Supplies Order Wrist Brace; Right; non-thumb spica  -     Hand Therapy Referral; Future      This issue is acute and Unchanged. Jamar presents our clinic today to discuss his acute right wrist pain.  He reports insidious onset of pain approximately 1 month ago that is diffuse throughout the right wrist.  Radiographs taken in clinic today and reviewed with the patient unremarkable for any acute or chronic bony abnormalities.  On examination, he is tender to palpation throughout the radiocarpal joint, and has pain with active and passive range of motion in all planes.  He does have intact strength but has pain with resisted motion testing in all planes as well.  He has negative Finkelstein's examination and no numbness and tingling.  Given the diffuse nature of his pain, no obvious deficits on exam and no structural abnormalities on radiographs, we discussed treating him conservatively with a brief period of immobilization, anti-inflammatories and hand therapy.  We determined the following plan:  - Patient was given a wrist brace, he will wear this for the next 5 to 7 days to rest the wrist  - Medrol Dosepak sent to pharmacy  - Hand therapy referral placed  - He will follow-up with me in 1 month if not improving, at that time would likely proceed with advanced imaging of the wrist       Brayden Reyna DO  [facility] [place]    -----  Chief Complaint   Patient presents with     "Right Wrist - Pain       SUBJECTIVE  Kannan Grijalva is a/an 34 year old male who is seen as a self referral for evaluation of right wrist.     The patient is seen by themselves.  The patient is Right handed    Onset: 1 month(s) ago. Reports insidious onset without acute precipitating event.  Location of Pain: right wrist, ulnar side and radial/dorsal aspect  Worsened by: flexion and extension of the wrist  Better with: rest  Treatments tried: rest/activity avoidance  Associated symptoms: swelling    Orthopedic/Surgical history: NO  Social History/Occupation: sales, computer work      REVIEW OF SYSTEMS:  Review of systems negative unless mentioned in HPI     OBJECTIVE:  Ht 1.753 m (5' 9\")   Wt 86.2 kg (190 lb)   BMI 28.06 kg/m     General: healthy, alert and in no distress  Skin: no suspicious lesions or rash.  CV: distal perfusion intact   Resp: normal respiratory effort without conversational dyspnea   Psych: normal mood and affect  Gait: NORMAL  Neuro: Normal light sensory exam of RU extremity     Wrist Exam    Left  Right   Inspection No atrophy, erythema , echymosis, or deformity  No atrophy, erythema , echymosis, or deformity    Palpation         Snuffbox tenderness None None       Tenderness over    No tenderness to palpation of radial styloid, DRUJ, TFCC, scaphoid TTP wrist joint   No tenderness to palpation of radial styloid, DRUJ, TFCC, scaphoid   Range of Motion (wrist)        Flexion @ Wrist  0-60  0-60       Radial Deviation  0-20  0-20       Ulnar Deviation 0-30  0-30       Extension @ Wrist  0-60  0-60    Strength Intact all planes  Intact all planes, all motions are painful    Special Tests      TFCC Grind Negative Negative   Tinel Negative Negative   Finkelstein Negative Negative   CMC Grind Negative Negative   Able to make OK sign (AIN) Negative Negative    Marie Shift Negative  Negative   Vascular   Intact  Intact    Sensation Intact to median, ulnar, and radial nerve distributions      "     RADIOLOGY:  Final results and radiologist's interpretation, available in the [facility] health record.  Images were reviewed with the patient in the office today.  My personal interpretation of the performed imaging: Normal joint spacing and alignment of the wrist.  No acute bony abnormalities

## 2025-03-17 ENCOUNTER — ANCILLARY PROCEDURE (OUTPATIENT)
Dept: GENERAL RADIOLOGY | Facility: CLINIC | Age: 35
End: 2025-03-17
Attending: STUDENT IN AN ORGANIZED HEALTH CARE EDUCATION/TRAINING PROGRAM
Payer: COMMERCIAL

## 2025-03-17 ENCOUNTER — OFFICE VISIT (OUTPATIENT)
Dept: ORTHOPEDICS | Facility: CLINIC | Age: 35
End: 2025-03-17
Payer: COMMERCIAL

## 2025-03-17 VITALS — BODY MASS INDEX: 28.14 KG/M2 | WEIGHT: 190 LBS | HEIGHT: 69 IN

## 2025-03-17 DIAGNOSIS — M25.531 RIGHT WRIST PAIN: Primary | ICD-10-CM

## 2025-03-17 DIAGNOSIS — M77.8 RIGHT WRIST TENDONITIS: ICD-10-CM

## 2025-03-17 DIAGNOSIS — M25.531 RIGHT WRIST PAIN: ICD-10-CM

## 2025-03-17 PROCEDURE — 73110 X-RAY EXAM OF WRIST: CPT | Mod: TC | Performed by: RADIOLOGY

## 2025-03-17 PROCEDURE — G2211 COMPLEX E/M VISIT ADD ON: HCPCS | Performed by: STUDENT IN AN ORGANIZED HEALTH CARE EDUCATION/TRAINING PROGRAM

## 2025-03-17 PROCEDURE — 99204 OFFICE O/P NEW MOD 45 MIN: CPT | Performed by: STUDENT IN AN ORGANIZED HEALTH CARE EDUCATION/TRAINING PROGRAM

## 2025-03-17 RX ORDER — METHYLPREDNISOLONE 4 MG/1
TABLET ORAL
Qty: 21 TABLET | Refills: 0 | Status: SHIPPED | OUTPATIENT
Start: 2025-03-17

## 2025-03-17 NOTE — LETTER
3/17/2025      Kannan Grijalva  [address] [place] [address]      Dear Colleague,    Thank you for referring your patient, Kannan Grijalva, to the [facility] [place]. Please see a copy of my visit note below.    ASSESSMENT & PLAN    Jamar was seen today for pain.    Diagnoses and all orders for this visit:    Right wrist pain  -     XR Wrist Right G/E 3 Views; Future  -     methylPREDNISolone (MEDROL DOSEPAK) 4 MG tablet therapy pack; Follow Package Directions  -     Wrist/Arm/Hand Bracing Supplies Order Wrist Brace; Right; non-thumb spica  -     Hand Therapy Referral; Future    Right wrist tendonitis  -     methylPREDNISolone (MEDROL DOSEPAK) 4 MG tablet therapy pack; Follow Package Directions  -     Wrist/Arm/Hand Bracing Supplies Order Wrist Brace; Right; non-thumb spica  -     Hand Therapy Referral; Future      This issue is acute and Unchanged. Jamar presents our clinic today to discuss his acute right wrist pain.  He reports insidious onset of pain approximately 1 month ago that is diffuse throughout the right wrist.  Radiographs taken in clinic today and reviewed with the patient unremarkable for any acute or chronic bony abnormalities.  On examination, he is tender to palpation throughout the radiocarpal joint, and has pain with active and passive range of motion in all planes.  He does have intact strength but has pain with resisted motion testing in all planes as well.  He has negative Finkelstein's examination and no numbness and tingling.  Given the diffuse nature of his pain, no obvious deficits on exam and no structural abnormalities on radiographs, we discussed treating him conservatively with a brief period of immobilization, anti-inflammatories and hand therapy.  We determined the following plan:  - Patient was given a wrist brace, he will wear this for the next 5 to 7 days to rest the wrist  - Medrol Dosepak sent to pharmacy  - Hand therapy referral  "placed  - He will follow-up with me in 1 month if not improving, at that time would likely proceed with advanced imaging of the wrist       Brayden Reyna DO  [facility] [place]    -----  Chief Complaint   Patient presents with     Right Wrist - Pain       SUBJECTIVE  Kannanguille Grijalva is a/an 34 year old male who is seen as a self referral for evaluation of right wrist.     The patient is seen by themselves.  The patient is Right handed    Onset: 1 month(s) ago. Reports insidious onset without acute precipitating event.  Location of Pain: right wrist, ulnar side and radial/dorsal aspect  Worsened by: flexion and extension of the wrist  Better with: rest  Treatments tried: rest/activity avoidance  Associated symptoms: swelling    Orthopedic/Surgical history: NO  Social History/Occupation: sales, computer work      REVIEW OF SYSTEMS:  Review of systems negative unless mentioned in HPI     OBJECTIVE:  Ht 1.753 m (5' 9\")   Wt 86.2 kg (190 lb)   BMI 28.06 kg/m     General: healthy, alert and in no distress  Skin: no suspicious lesions or rash.  CV: distal perfusion intact   Resp: normal respiratory effort without conversational dyspnea   Psych: normal mood and affect  Gait: NORMAL  Neuro: Normal light sensory exam of RU extremity     Wrist Exam    Left  Right   Inspection No atrophy, erythema , echymosis, or deformity  No atrophy, erythema , echymosis, or deformity    Palpation         Snuffbox tenderness None None       Tenderness over    No tenderness to palpation of radial styloid, DRUJ, TFCC, scaphoid TTP wrist joint   No tenderness to palpation of radial styloid, DRUJ, TFCC, scaphoid   Range of Motion (wrist)        Flexion @ Wrist  0-60  0-60       Radial Deviation  0-20  0-20       Ulnar Deviation 0-30  0-30       Extension @ Wrist  0-60  0-60    Strength Intact all planes  Intact all planes, all motions are painful    Special Tests      TFCC Grind Negative Negative   Tinel " Negative Negative   Finkelstein Negative Negative   CMC Grind Negative Negative   Able to make OK sign (AIN) Negative Negative    Marie Shift Negative  Negative   Vascular   Intact  Intact    Sensation Intact to median, ulnar, and radial nerve distributions          RADIOLOGY:  Final results and radiologist's interpretation, available in the [facility] health record.  Images were reviewed with the patient in the office today.  My personal interpretation of the performed imaging: Normal joint spacing and alignment of the wrist.  No acute bony abnormalities           Again, thank you for allowing me to participate in the care of your patient.        Sincerely,        Brayden Reyna, DO    Electronically signed

## 2025-03-18 ENCOUNTER — TELEPHONE (OUTPATIENT)
Dept: ORTHOPEDICS | Facility: CLINIC | Age: 35
End: 2025-03-18

## 2025-03-18 DIAGNOSIS — M25.531 RIGHT WRIST PAIN: Primary | ICD-10-CM

## 2025-03-18 NOTE — TELEPHONE ENCOUNTER
MRI scheduling number sent to patient via [facility].    Swetha Iniguez, ANNA, LAT, ATC

## 2025-03-18 NOTE — TELEPHONE ENCOUNTER
Radiology read for patient's x-ray of the right wrist came back with mild sclerosis of the lunate, with ulnar negative variance concerning for possible osteonecrosis of the lunate.  Reviewed these images with my orthopedic hand surgery colleague Dr. Oliver who recommended MRI with and without contrast of the wrist for further evaluation.  I called the patient and spoke to him regarding these results and the next steps including the MRI.  He had opportunity to ask questions and all of the patient's questions were answered.  We will reach out to the patient once his MRI results are back to determine the next steps in treatment    LIZETT Reyna DO

## 2025-03-28 ENCOUNTER — HOSPITAL ENCOUNTER (OUTPATIENT)
Dept: MRI IMAGING | Facility: CLINIC | Age: 35
Discharge: HOME | End: 2025-03-28
Attending: STUDENT IN AN ORGANIZED HEALTH CARE EDUCATION/TRAINING PROGRAM | Admitting: STUDENT IN AN ORGANIZED HEALTH CARE EDUCATION/TRAINING PROGRAM
Payer: COMMERCIAL

## 2025-03-28 DIAGNOSIS — M25.531 RIGHT WRIST PAIN: ICD-10-CM

## 2025-03-28 PROCEDURE — 73223 MRI JOINT UPR EXTR W/O&W/DYE: CPT | Mod: 26 | Performed by: RADIOLOGY

## 2025-03-28 PROCEDURE — 255N000002 HC RX 255 OP 636: Performed by: STUDENT IN AN ORGANIZED HEALTH CARE EDUCATION/TRAINING PROGRAM

## 2025-03-28 PROCEDURE — A9585 GADOBUTROL INJECTION: HCPCS | Performed by: STUDENT IN AN ORGANIZED HEALTH CARE EDUCATION/TRAINING PROGRAM

## 2025-03-28 PROCEDURE — 73223 MRI JOINT UPR EXTR W/O&W/DYE: CPT | Mod: RT

## 2025-03-28 RX ORDER — GADOBUTROL 604.72 MG/ML
8 INJECTION INTRAVENOUS ONCE
Status: COMPLETED | OUTPATIENT
Start: 2025-03-28 | End: 2025-03-28

## 2025-03-28 RX ADMIN — GADOBUTROL 8 ML: 604.72 INJECTION INTRAVENOUS at 09:30

## 2025-03-31 ENCOUNTER — MYC MEDICAL ADVICE (OUTPATIENT)
Dept: ORTHOPEDICS | Facility: CLINIC | Age: 35
End: 2025-03-31
Payer: COMMERCIAL

## 2025-03-31 NOTE — TELEPHONE ENCOUNTER
Outbound call to patient to schedule. He had multiple questions regarding his MRI results, and has been scheduled this week with Dr. Kiran. Writer assured patient that Dr. Kiran is fantastic, and will present all of his options at the visit.    Swetha Iniguez, ANNA, LAT, ATC

## 2025-03-31 NOTE — TELEPHONE ENCOUNTER
Spoke with Dr. Kiran about this, he should be seen by Dr. Kiran or Benito within the next 1-2 weeks.   Thanks  LIZETT Reyna,

## 2025-04-01 NOTE — PROGRESS NOTES
Orthopaedic Surgery Hand and Upper Extremity New Clinic Note:  Date: Apr 2, 2025     Visit Provider: Vern Kiran MD  Patient ID:[id number]  Referring Provider: No ref. provider found    Chief Complaint: right wrist pain      Dominant Hand: Right    Occupation: Sales, computer work      HPI:  Mr. Kannan Grijalva is a 34 year old male right hand dominant who presents with right wrist pain. He reports experiencing wrist pain that began approximately two months ago. Initially, he felt a tweak and mild soreness, which escalated to the point where his wrist became unusable for about ten days. Since then, the pain has persisted as a nuisance, with some loss of mobility. He described the pain as most severe in the middle of the back of the wrist, particularly when moving it. He has been using a brace since meeting with Dr. Reyna and completed a course of steroids prescribed two weeks ago. Despite these measures, the pain has not significantly changed. Jamar mentioned that he has tried anti-inflammatory medications like Tylenol and Motrin, which provide temporary relief, but the pain returns. He initially suspected gout, as he had a previous episode in his foot, and took Indomethacin, but it was ineffective for his wrist. Jamar has no other medical problems and has not taken steroids before this incident. He has a history of a hernia but does not recall having surgery for it. 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    Symptom Onset: 2 months ago  Trauma/Inciting Event: none  Location of pain/symptoms: right wrist, dorsal aspect  Duration (constant/Intermittent, etc): intermittent  Characteristics of pain (sharp, dull, etc): sharp  Aggravating factors: wrist movement, wrist flexion, extension, ulnar deviation, and radial deviation  Prior Treatment: oral steroids, wrist brace  Injections (date): none  EMG (for carpal tunnel, etc):  none    PMH  Diabetes: no  Thyroid Problems: no  Smoking Y/N: no      PAST MEDICAL HISTORY:  No past medical history on file.    PAST SURGICAL HISTORY:  No past surgical history on file.    MEDICATIONS:  Current Outpatient Medications   Medication Sig Dispense Refill    indomethacin (INDOCIN) 50 MG capsule Take 1 capsule (50 mg) by mouth 2 times daily (with meals). 60 capsule 1    indomethacin (INDOCIN) 50 MG capsule TAKE 1 CAPSULE (50 MG) BY MOUTH 2 TIMES DAILY (WITH MEALS) (Patient not taking: Reported on 2/12/2025) 10 capsule 0    methylPREDNISolone (MEDROL DOSEPAK) 4 MG tablet therapy pack Follow Package Directions 21 tablet 0    valACYclovir (VALTREX) 1000 mg tablet Take 2 tablets (2,000 mg) by mouth 2 times daily for 1 day. At first sign of outbreak. 4 tablet 0     No current facility-administered medications for this visit.       ALLERGIES:   No Known Allergies    FAMILY HISTORY:  No pertinent family history    SOCIAL HISTORY:  Social History     Tobacco Use    Smoking status: Never    Smokeless tobacco: Never   Vaping Use    Vaping status: Never Used   Substance Use Topics    Alcohol use: Yes    Drug use: Never       The patient's past medical, family, and social history was reviewed and confirmed.    REVIEW OF SYMPTOMS:      General: Negative   Eyes: Negative   Ear, Nose and Throat: Negative   Respiratory: Negative   Cardiovascular: Negative   Gastrointestinal: Negative   Genito-urinary: Negative   Musculoskeletal: see HPI  Neurological: Negative   Psychological: Negative  HEME: Negative   ENDO: Negative   SKIN: Negative    VITALS:  There were no vitals filed for this visit.    EXAM:  General: NAD, A&Ox3  HEENT: NC/AT  CV: RRR by peripheral pulse  Pulmonary: Non-labored breathing on RA    Right  Wrist    Inspection:  There is no evidence of open wounds.   There is no evidence of erythema or infection  There is a mild amount of dorsal swelling compared to contralateral wrist  There is not appreciable  intrinsic atrophy  There is well maintained thenar musculature    Palpation:  There is no palpable fluctuance  There is tenderness to palpation at the dorsal SL interval as well as the ulna fovea. No tenderness over the snuffbox. No crepitus or clunk during scaphoid shift    Motor:  Intact M/R/U nerves    Sensory:  Intact to light touch in the median, radial, and ulnar nerve distributions      Measurements:      Range of Motion:  (Wrist): (Left Affected )  Flexion: 75 degrees  Extension: 70 degrees  Pronation: 90 degrees  Supination: 90 degrees    (Wrist): (Right Unaffected )  Flexion: 75 degrees  Extension: 80 degrees  Pronation: 90 degrees  Supination: 90 degrees       IMAGING:    3 view x-ray from 3/17 are reviewed by me today.  They demonstrate subtle sclerosis of the lunate consistent with Keen box disease.  This is a type I lunate without evidence of fissure or collapse.    MRI without contrast of the right wrist was reviewed by me today.  There is hypointensity of the lunate on fat sensitive sequences.  The cartilage Appears intact.  There is no evidence of collapse.  There are fissures within the lunate without chelita fragmentation.      I have personally reviewed the above images and labs.         IMPRESSION AND RECOMMENDATIONS:  Right wrist Kienböck's disease    Kannan Grijalva is a 34 year old male right hand dominant with Lichtman stage II Kienböck's disease.  I reviewed my clinical and radiographic findings with the patient today.  I discussed the natural history and course of Kienböck's disease with the patient today.  Discussed with him that at this time he has evidence of sclerosis on x-ray as well as signal abnormalities within the lunate on MRI.  Fortunately, he does not have chelita fragmentation or collapse of the lunate at this time.  I discussed with him that early treatment for Kienböck's disease is critical and that this consists of a period of immobilization for between 6 and 12 weeks.  I  discussed with him that cast immobilization would likely result in a more strictly immobilized wrist but that I understand with his life it may be more convenient for him to wear a brace.  He will anticipate wearing the brace full-time except for showers for the next 8 weeks.  I would like to see him in 8 weeks for repeat clinical and radiographic examination.              Vern Kiran MD    Hand, Upper Extremity & Microvascular Surgery  Department of Orthopaedic Surgery  [facility]

## 2025-04-02 ENCOUNTER — OFFICE VISIT (OUTPATIENT)
Dept: ORTHOPEDICS | Facility: CLINIC | Age: 35
End: 2025-04-02
Payer: COMMERCIAL

## 2025-04-02 VITALS — WEIGHT: 190 LBS | BODY MASS INDEX: 28.14 KG/M2 | HEIGHT: 69 IN

## 2025-04-02 DIAGNOSIS — M93.1 KIENBOCK DISEASE OF LUNATE BONE OF RIGHT WRIST IN ADULT: Primary | ICD-10-CM

## 2025-04-02 NOTE — PATIENT INSTRUCTIONS
NSAID Pain Medication Protocol    Care of pain:      Do not play a  catch up  game with pain. Take Tylenol and Ibuprofen (Motrin/Advil) or an equivalent around the clock for the 1-3 days.  It is much easier to anticipate pain and treat it, rather than treat it after it has appeared. Take Tylenol 650mg every 6 hours (never more than 3000mg in one day). If you do not have kidney or stomach disease, take Ibuprofen (Motrin/Advil) 400mg every 6 hours. Alternate and stagger these medications so you are taking something every 3 hours. For example, take tylenol then three hours later take ibuprofen. Three hours after that, take tylenol again and repeat around the clock except while sleeping.

## 2025-04-02 NOTE — LETTER
4/2/2025      Kannan Grijalva  [address] [place] [id number]      Dear Colleague,    Thank you for referring your patient, Kannan Grijalva, to the [facility] [place]. Please see a copy of my visit note below.    Orthopaedic Surgery Hand and Upper Extremity New Clinic Note:  Date: Apr 2, 2025     Visit Provider: eVrn Kiran MD  Patient ID:[id number]  Referring Provider: No ref. provider found    Chief Complaint: right wrist pain      Dominant Hand: Right    Occupation: Sales, computer work      HPI:  Mr. Kannan Grijalva is a 34 year old male right hand dominant who presents with right wrist pain. He reports experiencing wrist pain that began approximately two months ago. Initially, he felt a tweak and mild soreness, which escalated to the point where his wrist became unusable for about ten days. Since then, the pain has persisted as a nuisance, with some loss of mobility. He described the pain as most severe in the middle of the back of the wrist, particularly when moving it. He has been using a brace since meeting with Dr. Reyna and completed a course of steroids prescribed two weeks ago. Despite these measures, the pain has not significantly changed. Jamar mentioned that he has tried anti-inflammatory medications like Tylenol and Motrin, which provide temporary relief, but the pain returns. He initially suspected gout, as he had a previous episode in his foot, and took Indomethacin, but it was ineffective for his wrist. Jamar has no other medical problems and has not taken steroids before this incident. He has a history of a hernia but does not recall having surgery for it. 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    Symptom Onset: 2 months ago  Trauma/Inciting Event: none  Location of pain/symptoms: right wrist, dorsal aspect  Duration (constant/Intermittent, etc): intermittent  Characteristics of pain  (sharp, dull, etc): sharp  Aggravating factors: wrist movement, wrist flexion, extension, ulnar deviation, and radial deviation  Prior Treatment: oral steroids, wrist brace  Injections (date): none  EMG (for carpal tunnel, etc): none    PMH  Diabetes: no  Thyroid Problems: no  Smoking Y/N: no      PAST MEDICAL HISTORY:  No past medical history on file.    PAST SURGICAL HISTORY:  No past surgical history on file.    MEDICATIONS:  Current Outpatient Medications   Medication Sig Dispense Refill     indomethacin (INDOCIN) 50 MG capsule Take 1 capsule (50 mg) by mouth 2 times daily (with meals). 60 capsule 1     indomethacin (INDOCIN) 50 MG capsule TAKE 1 CAPSULE (50 MG) BY MOUTH 2 TIMES DAILY (WITH MEALS) (Patient not taking: Reported on 2/12/2025) 10 capsule 0     methylPREDNISolone (MEDROL DOSEPAK) 4 MG tablet therapy pack Follow Package Directions 21 tablet 0     valACYclovir (VALTREX) 1000 mg tablet Take 2 tablets (2,000 mg) by mouth 2 times daily for 1 day. At first sign of outbreak. 4 tablet 0     No current facility-administered medications for this visit.       ALLERGIES:   No Known Allergies    FAMILY HISTORY:  No pertinent family history    SOCIAL HISTORY:  Social History     Tobacco Use     Smoking status: Never     Smokeless tobacco: Never   Vaping Use     Vaping status: Never Used   Substance Use Topics     Alcohol use: Yes     Drug use: Never       The patient's past medical, family, and social history was reviewed and confirmed.    REVIEW OF SYMPTOMS:      General: Negative   Eyes: Negative   Ear, Nose and Throat: Negative   Respiratory: Negative   Cardiovascular: Negative   Gastrointestinal: Negative   Genito-urinary: Negative   Musculoskeletal: see HPI  Neurological: Negative   Psychological: Negative  HEME: Negative   ENDO: Negative   SKIN: Negative    VITALS:  There were no vitals filed for this visit.    EXAM:  General: NAD, A&Ox3  HEENT: NC/AT  CV: RRR by peripheral pulse  Pulmonary: Non-labored  breathing on RA    Right  Wrist    Inspection:  There is no evidence of open wounds.   There is no evidence of erythema or infection  There is a mild amount of dorsal swelling compared to contralateral wrist  There is not appreciable intrinsic atrophy  There is well maintained thenar musculature    Palpation:  There is no palpable fluctuance  There is tenderness to palpation at the dorsal SL interval as well as the ulna fovea. No tenderness over the snuffbox. No crepitus or clunk during scaphoid shift    Motor:  Intact M/R/U nerves    Sensory:  Intact to light touch in the median, radial, and ulnar nerve distributions      Measurements:      Range of Motion:  (Wrist): (Left Affected )  Flexion: 75 degrees  Extension: 70 degrees  Pronation: 90 degrees  Supination: 90 degrees    (Wrist): (Right Unaffected )  Flexion: 75 degrees  Extension: 80 degrees  Pronation: 90 degrees  Supination: 90 degrees       IMAGING:    3 view x-ray from 3/17 are reviewed by me today.  They demonstrate subtle sclerosis of the lunate consistent with Keen box disease.  This is a type I lunate without evidence of fissure or collapse.    MRI without contrast of the right wrist was reviewed by me today.  There is hypointensity of the lunate on fat sensitive sequences.  The cartilage Appears intact.  There is no evidence of collapse.  There are fissures within the lunate without chelita fragmentation.      I have personally reviewed the above images and labs.         IMPRESSION AND RECOMMENDATIONS:  Right wrist Kienböck's disease    Kannan Grijalva is a 34 year old male right hand dominant with Lichtman stage II Kienböck's disease.  I reviewed my clinical and radiographic findings with the patient today.  I discussed the natural history and course of Kienböck's disease with the patient today.  Discussed with him that at this time he has evidence of sclerosis on x-ray as well as signal abnormalities within the lunate on MRI.  Fortunately, he does  not have chelita fragmentation or collapse of the lunate at this time.  I discussed with him that early treatment for Kienböck's disease is critical and that this consists of a period of immobilization for between 6 and 12 weeks.  I discussed with him that cast immobilization would likely result in a more strictly immobilized wrist but that I understand with his life it may be more convenient for him to wear a brace.  He will anticipate wearing the brace full-time except for showers for the next 8 weeks.  I would like to see him in 8 weeks for repeat clinical and radiographic examination.              Vern Kiran MD    Hand, Upper Extremity & Microvascular Surgery  Department of Orthopaedic Surgery  [facility]          Again, thank you for allowing me to participate in the care of your patient.        Sincerely,        Vern Kiran MD    Electronically signed

## 2025-05-28 ENCOUNTER — TELEPHONE (OUTPATIENT)
Dept: ORTHOPEDICS | Facility: CLINIC | Age: 35
End: 2025-05-28
Payer: COMMERCIAL

## 2025-05-28 NOTE — PROGRESS NOTES
Orthopaedic Surgery Hand and Upper Extremity Follow Up Clinic Note:  Date: May 29, 2025     Visit Provider: Vern Kiran MD  Patient ID:[id number]    Chief Complaint: Kienbock disease of lunate bone of right wrist in adult       Dominant Hand: Right    Occupation: Sales, computer work    Date of Last Visit: 4/2/2025    Subjective:  Kannan Grijalva is a 34 year old male who returns 8 weeks after last visit for the above.  He has been wearing his brace full-time with the exception of showers.  He estimates that has been wearing it for 95% of the time.  He does admit that he occasionally takes it off triggers to put back on for short while.  He has been sleeping with the brace.  He feels like the pain is about the same as his last visit.  He is wearing the brace he has no pain in the wrist.      Objective:    There were no vitals taken for this visit.    General: alert, appropriate, NAD  HEENT: NC/AT  CV: RRR by pulse  Pulm: Unlabored on RA  MSK:  Right  Wrist     Inspection:  There is no evidence of open wounds.   There is no evidence of erythema or infection  There is a mild amount of dorsal swelling compared to contralateral wrist  There is not appreciable intrinsic atrophy  There is well maintained thenar musculature     Palpation:  There is no palpable fluctuance  There is tenderness to palpation at the dorsal SL interval as well as the ulna fovea. No tenderness over the snuffbox. No crepitus or clunk during scaphoid shift     Motor:  Intact M/R/U nerves     Sensory:  Intact to light touch in the median, radial, and ulnar nerve distributions        Measurements:        Range of Motion:  (Wrist): (Right Affected )  Flexion: 80 degrees  Extension: 70 degrees  Pronation: 90 degrees  Supination: 90 degrees     (Wrist): (Left Unaffected )  Flexion: 75 degrees  Extension: 80 degrees  Pronation: 90 degrees  Supination: 90 degrees        Imaging:    Three-view x-ray of the right wrist was obtained in the office and  interpreted by me today.  Again is demonstrated sclerosis of the lunate.  There is no obvious fragmentation or ulnar translocation.  3 mm ulnar negative variance.      Assessment/Plan:  8 weeks of bracing of right wrist Kienböck's without collapse    Kannan Grijalva is a 34-year-old male here for the above.  I reviewed my clinical and radiographic findings with him today.  I discussed with him that at this time he has had no further collapse of his lunate.  However, he admits that he still has persistent pain in the wrist during motion as well as tenderness on my examination today.  I discussed with him that we should attempt a full 12 weeks of nonoperative management before pursuing operative intervention.  I discussed with him that if at his next visit he has persistent pain and unchanged appearance of the lunate on x-ray we should consider radial shortening osteotomy to unload the lunate.  I again broached the subject of casting with him but he prefers bracing.  I will like to see him again in 4 weeks for repeat clinical and radiographic evaluation      Vern Kiran MD    Hand, Upper Extremity & Microvascular Surgery  Department of Orthopaedic Surgery  [facility]

## 2025-05-28 NOTE — TELEPHONE ENCOUNTER
Outbound call to patient.  He is currently scheduled to see Dr. Kiran on 5/29 at 11:40.  Dr. Kiran would prefer to put patient in the 9:40 slot as Dr. Kiran has afternoon surgery.  9:40 slot is held for this patient.  Left voicemail with patient to call back.    Dino Gutiérrez LAT

## 2025-05-29 ENCOUNTER — OFFICE VISIT (OUTPATIENT)
Dept: ORTHOPEDICS | Facility: CLINIC | Age: 35
End: 2025-05-29
Payer: COMMERCIAL

## 2025-05-29 DIAGNOSIS — M93.1 KIENBOCK DISEASE OF LUNATE BONE OF RIGHT WRIST IN ADULT: Primary | ICD-10-CM

## 2025-05-29 DIAGNOSIS — M25.531 RIGHT WRIST PAIN: ICD-10-CM

## 2025-05-29 NOTE — LETTER
5/29/2025      Kannan Grijalva  [address] [facility] [address]      Dear Colleague,    Thank you for referring your patient, Kannan Grijalva, to the [facility] [place]. Please see a copy of my visit note below.    Orthopaedic Surgery Hand and Upper Extremity Follow Up Clinic Note:  Date: May 29, 2025     Visit Provider: Vern Kiran MD  Patient ID:[id number]    Chief Complaint: Kienbock disease of lunate bone of right wrist in adult       Dominant Hand: Right    Occupation: Sales, computer work    Date of Last Visit: 4/2/2025    Subjective:  Kannan Grijalva is a 34 year old male who returns 8 weeks after last visit for the above.  He has been wearing his brace full-time with the exception of showers.  He estimates that has been wearing it for 95% of the time.  He does admit that he occasionally takes it off triggers to put back on for short while.  He has been sleeping with the brace.  He feels like the pain is about the same as his last visit.  He is wearing the brace he has no pain in the wrist.      Objective:    There were no vitals taken for this visit.    General: alert, appropriate, NAD  HEENT: NC/AT  CV: RRR by pulse  Pulm: Unlabored on RA  MSK:  Right  Wrist     Inspection:  There is no evidence of open wounds.   There is no evidence of erythema or infection  There is a mild amount of dorsal swelling compared to contralateral wrist  There is not appreciable intrinsic atrophy  There is well maintained thenar musculature     Palpation:  There is no palpable fluctuance  There is tenderness to palpation at the dorsal SL interval as well as the ulna fovea. No tenderness over the snuffbox. No crepitus or clunk during scaphoid shift     Motor:  Intact M/R/U nerves     Sensory:  Intact to light touch in the median, radial, and ulnar nerve distributions        Measurements:        Range of Motion:  (Wrist): (Right Affected )  Flexion: 80 degrees  Extension: 70  degrees  Pronation: 90 degrees  Supination: 90 degrees     (Wrist): (Left Unaffected )  Flexion: 75 degrees  Extension: 80 degrees  Pronation: 90 degrees  Supination: 90 degrees        Imaging:    Three-view x-ray of the right wrist was obtained in the office and interpreted by me today.  Again is demonstrated sclerosis of the lunate.  There is no obvious fragmentation or ulnar translocation.  3 mm ulnar negative variance.      Assessment/Plan:  8 weeks of bracing of right wrist Kienböck's without collapse    Kannan Grijalva is a 34-year-old male here for the above.  I reviewed my clinical and radiographic findings with him today.  I discussed with him that at this time he has had no further collapse of his lunate.  However, he admits that he still has persistent pain in the wrist during motion as well as tenderness on my examination today.  I discussed with him that we should attempt a full 12 weeks of nonoperative management before pursuing operative intervention.  I discussed with him that if at his next visit he has persistent pain and unchanged appearance of the lunate on x-ray we should consider radial shortening osteotomy to unload the lunate.  I again broached the subject of casting with him but he prefers bracing.  I will like to see him again in 4 weeks for repeat clinical and radiographic evaluation      Vern Kiran MD    Hand, Upper Extremity & Microvascular Surgery  Department of Orthopaedic Surgery  [facility]    Again, thank you for allowing me to participate in the care of your patient.        Sincerely,        Vern Kiran MD    Electronically signed

## 2025-06-04 NOTE — PROGRESS NOTES
Orthopaedic Surgery Hand and Upper Extremity Follow Up Clinic Note:  Date: Jun 26, 2025     Visit Provider: Vern Kiran MD  Patient ID:[id number]    Chief Complaint: Kienbock disease of lunate bone of right wrist in adult      Dominant Hand: Right     Occupation: Sales, computer work    Date of Last Visit: 5/29/2025    Subjective:  Kannan Grijalva is a 34 year old male who returns 4 weeks after last visit for the above. He has been diligent in keeping the brace on at all times and avoids activities without it. Despite the immobilization, he notes that the wrist hurts when the brace is removed, although the pain is not severe.  He is here today for repeat radiographic evaluation.  He has now attempted 3.5 months of immobilization.    Objective:    There were no vitals taken for this visit.    General: alert, appropriate, NAD  HEENT: NC/AT  CV: RRR by pulse  Pulm: Unlabored on RA  MSK:    Right  Wrist     Inspection:  There is no evidence of open wounds.   There is no evidence of erythema or infection  There is a mild amount of dorsal swelling compared to contralateral wrist  There is not appreciable intrinsic atrophy  There is well maintained thenar musculature     Palpation:  There is no palpable fluctuance  There is tenderness to palpation at the dorsal SL interval. No tenderness over the snuffbox. No crepitus or clunk during scaphoid shift     Motor:  Intact M/R/U nerves     Sensory:  Intact to light touch in the median, radial, and ulnar nerve distributions       Measurements:        Range of Motion:  (Wrist): (Right Affected )  Flexion: 80 degrees  Extension: 70 degrees  Pronation: 90 degrees  Supination: 90 degrees     (Wrist): (Left Unaffected )  Flexion: 75 degrees  Extension: 80 degrees  Pronation: 90 degrees  Supination: 90 degrees    Imaging:  Three-view x-ray of the right wrist obtained in the office interpreted by me today.  Again is demonstrated sclerosis of the lunate without significant  "improvement.  No evidence of fragmentation or collapse.  3 mm ulnar negative variance.    Assessment/Plan:  Status post 3.5 months nonoperative management Lichtman stage II Kienböck's right wrist    Kannan Grijalva is a 34-year-old male who presents today for the above.  I reviewed my clinical and radiographic findings with him today.  At this time he has not had significant improvement in the sclerosis of the lunate on x-ray.  Further, he has persistent pain and stiffness in the wrist.  I discussed with him that I reviewed my case at the interdisciplinary hand surgery conference and it was the opinion of both myself and the group that he is likely a candidate for radial shortening osteotomy.  I discussed with him the risks, benefits, alternatives to this treatment including further nonoperative management. We discussed that the risks of surgery including bleeding, infection, damage to surrounding tendon or neurovascular structures, malunion, nonunion, symptomatic hardware, need for hardware removal. I discussed that this procedure is meant to \"offload\" the lunate and achieve ulnar neutral variance. The benefits would be to prevent collapse or fragmentation of his lunate and worsening of his current condition. He asked several questions which were answered to his satisfaction. Together a shared decision was made to perform radial shortening osteotomy in order to prevent collapse of his carpus necessitating nonanatomic salvage procedure in the future. I reviewed with him this possibility even if a successful osteotomy procedure. He will be scheduled for soonest convenience. He will continue to wear the brace in the meantime.       Vern Kiran MD    Hand, Upper Extremity & Microvascular Surgery  Department of Orthopaedic Surgery  [facility]  "

## 2025-06-26 ENCOUNTER — OFFICE VISIT (OUTPATIENT)
Dept: ORTHOPEDICS | Facility: CLINIC | Age: 35
End: 2025-06-26
Payer: COMMERCIAL

## 2025-06-26 DIAGNOSIS — M25.531 RIGHT WRIST PAIN: ICD-10-CM

## 2025-06-26 DIAGNOSIS — M93.1 KIENBOCK DISEASE OF LUNATE BONE OF RIGHT WRIST IN ADULT: Primary | ICD-10-CM

## 2025-06-26 NOTE — LETTER
6/26/2025      Kannan Grijalva  [address] [facility] [address]      Dear Colleague,    Thank you for referring your patient, Kannan Grijalva, to the [facility] [place]. Please see a copy of my visit note below.    Orthopaedic Surgery Hand and Upper Extremity Follow Up Clinic Note:  Date: Jun 26, 2025     Visit Provider: Vern Kiran MD  Patient ID:[id number]    Chief Complaint: Kienbock disease of lunate bone of right wrist in adult      Dominant Hand: Right     Occupation: Sales, computer work    Date of Last Visit: 5/29/2025    Subjective:  Kannan Grijalva is a 34 year old male who returns 4 weeks after last visit for the above. He has been diligent in keeping the brace on at all times and avoids activities without it. Despite the immobilization, he notes that the wrist hurts when the brace is removed, although the pain is not severe.  He is here today for repeat radiographic evaluation.  He has now attempted 3.5 months of immobilization.    Objective:    There were no vitals taken for this visit.    General: alert, appropriate, NAD  HEENT: NC/AT  CV: RRR by pulse  Pulm: Unlabored on RA  MSK:    Right  Wrist     Inspection:  There is no evidence of open wounds.   There is no evidence of erythema or infection  There is a mild amount of dorsal swelling compared to contralateral wrist  There is not appreciable intrinsic atrophy  There is well maintained thenar musculature     Palpation:  There is no palpable fluctuance  There is tenderness to palpation at the dorsal SL interval. No tenderness over the snuffbox. No crepitus or clunk during scaphoid shift     Motor:  Intact M/R/U nerves     Sensory:  Intact to light touch in the median, radial, and ulnar nerve distributions       Measurements:        Range of Motion:  (Wrist): (Right Affected )  Flexion: 80 degrees  Extension: 70 degrees  Pronation: 90 degrees  Supination: 90 degrees     (Wrist): (Left Unaffected )  Flexion: 75  "degrees  Extension: 80 degrees  Pronation: 90 degrees  Supination: 90 degrees    Imaging:  Three-view x-ray of the right wrist obtained in the office interpreted by me today.  Again is demonstrated sclerosis of the lunate without significant improvement.  No evidence of fragmentation or collapse.  3 mm ulnar negative variance.    Assessment/Plan:  Status post 3.5 months nonoperative management Lichtman stage II Kienböck's right wrist    Kannan Grijalva is a 34-year-old male who presents today for the above.  I reviewed my clinical and radiographic findings with him today.  At this time he has not had significant improvement in the sclerosis of the lunate on x-ray.  Further, he has persistent pain and stiffness in the wrist.  I discussed with him that I reviewed my case at the interdisciplinary hand surgery conference and it was the opinion of both myself and the group that he is likely a candidate for radial shortening osteotomy.  I discussed with him the risks, benefits, alternatives to this treatment including further nonoperative management. We discussed that the risks of surgery including bleeding, infection, damage to surrounding tendon or neurovascular structures, malunion, nonunion, symptomatic hardware, need for hardware removal. I discussed that this procedure is meant to \"offload\" the lunate and achieve ulnar neutral variance. The benefits would be to prevent collapse or fragmentation of his lunate and worsening of his current condition. He asked several questions which were answered to his satisfaction. Together a shared decision was made to perform radial shortening osteotomy in order to prevent collapse of his carpus necessitating nonanatomic salvage procedure in the future. I reviewed with him this possibility even if a successful osteotomy procedure. He will be scheduled for soonest convenience. He will continue to wear the brace in the meantime.       Vern Kiran MD    Hand, " Upper Extremity & Microvascular Surgery  Department of Orthopaedic Surgery  [facility]    Again, thank you for allowing me to participate in the care of your patient.        Sincerely,        Vern Kiran MD    Electronically signed

## 2025-07-08 ENCOUNTER — TELEPHONE (OUTPATIENT)
Dept: ORTHOPEDICS | Facility: CLINIC | Age: 35
End: 2025-07-08
Payer: COMMERCIAL

## 2025-07-08 DIAGNOSIS — M93.1 KIENBOCK DISEASE OF LUNATE BONE OF RIGHT WRIST IN ADULT: ICD-10-CM

## 2025-07-08 DIAGNOSIS — Z98.890 STATUS POST OSTEOTOMY: Primary | ICD-10-CM

## 2025-07-08 NOTE — TELEPHONE ENCOUNTER
Teaching Flowsheet     Visit Type: Telephone    Time Start: 1131  Time End: 1145  Total Time Spent: 14 min.    Surgeon: Dr. Kiran  Location of Surgery (known or anticipated): at [facility] [place].   Type of Anesthesia: MAC or Regional Block  Worker's Compensation Procedure: No    Pertinent Medical History: No Pertinent Medical History or gout, cold sores.  Were medical conditions reviewed and appropriate for location? 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   Verified Patient's Phone Number: YES: [phone]    Caregiver//  Name: Keyla  Phone Number: [phone]   Relationship: Wife  Consent to Communicate on file: No     Motivation Level:  Asks Questions: Yes  Eager to Learn: Yes  Cooperative: Yes  Receptive (willing/able to accept information): Yes  Any cultural factors/Orthodox beliefs that may influence understanding or compliance? No     Patient demonstrates understanding of the following:  Reason for the appointment, diagnosis and treatment plan: Yes  Knowledge of proper use of medications and conditions for which they are ordered (with special attention to potential side effects or drug interactions): Yes  Which situations necessitate calling provider and whom to contact: Yes     Teaching Concerns Addressed:   Proper use and care of medical equip, care aids, etc.: Yes  Nutritional needs and diet plan: Yes  Pain management techniques: Yes  Wound Care: Yes  How and/when to access community resources: Yes  Need for pre-op with in 30 days: YES, will be done with PCP. I asked them to ensure they go over their daily medications during this visit and discuss what medications need to be stopped before surgery and when. If you are doing a pre-op with your PCP and they are not within the [facility], I ask them to fax it to our pre-op office. Patient verbalized understanding.       Does patient have difficulty getting a ride to appointments (post-ops, PT/OT): No  Patient's plan after discharge: home with family or spouse     Instructional Materials Used/Given:  two bottles of chlorhexidine soap and a surgery packet sent via [employer]. Instructed patient to buy or get two 8 ounces bottles of antiseptic surgical soap called 4% CHG. Common name brand of this soap are Hibiclens and Exidine. I told them they can find this at their local pharmacy, clinic or retail store. If they have trouble finding it, I told them to ask their pharmacist to help them find a substitute.   - Important Contact Info/Phone Numbers: [facility] clinic number [phone].  - Map/location of surgery and follow-up appointments  - Showering instructions  - Stop[employer] Tool     -Next step: surgery scheduled for 7/17 and pre-op scheduled with PCP for 7/10.    Carolyn Fraser RN

## 2025-07-10 ENCOUNTER — OFFICE VISIT (OUTPATIENT)
Dept: FAMILY MEDICINE | Facility: CLINIC | Age: 35
End: 2025-07-10
Payer: COMMERCIAL

## 2025-07-10 VITALS
HEIGHT: 69 IN | DIASTOLIC BLOOD PRESSURE: 86 MMHG | HEART RATE: 89 BPM | RESPIRATION RATE: 17 BRPM | TEMPERATURE: 98 F | WEIGHT: 191.2 LBS | BODY MASS INDEX: 28.32 KG/M2 | SYSTOLIC BLOOD PRESSURE: 121 MMHG | OXYGEN SATURATION: 99 %

## 2025-07-10 DIAGNOSIS — Z01.818 PREOP GENERAL PHYSICAL EXAM: Primary | ICD-10-CM

## 2025-07-10 DIAGNOSIS — S62.124G: ICD-10-CM

## 2025-07-10 ASSESSMENT — PAIN SCALES - GENERAL: PAINLEVEL_OUTOF10: NO PAIN (0)

## 2025-07-10 NOTE — PROGRESS NOTES
Preoperative Evaluation  [facility]  [address] [address] [address]  Phone: [phone]  Primary Provider: Physician No Ref-Primary  Pre-op Performing Provider: Celestine Beckwith MD  Jul 10, 2025   {Provider  Link to PREOP SmartSet  REQUIRED to apply standard patient instructions and medication directions to the AVS :423349}  {ROOMER review and update patient entered surgical information if needed :329045}        7/9/2025   Surgical Information   What procedure is being done? Radial shortening osteotemy   Facility or Hospital where procedure/surgery will be performed: [facility]   Who is doing the procedure / surgery? Dr cruz   Date of surgery / procedure: 7/17   Time of surgery / procedure: Unknown   Where do you plan to recover after surgery? at home with family     Fax number for surgical facility: Note does not need to be faxed, will be available electronically in Epic.    Assessment and Plan    (Z01.818) Preop general physical exam  (primary encounter diagnosis)  Comment: cleared for surgery without further eval  Plan:     (S62.124G) Closed nondisplaced fracture of lunate of right wrist with delayed healing, subsequent encounter  Comment:   Plan:       [place] [facility] [place]    Celestine Beckwith MD     Michael Roldan is a 34 year old, presenting for the following:  Pre-Op Exam          7/10/2025     9:08 AM   Additional Questions   Roomed by MR   Accompanied by Self     HPI: Radial shortening osteotemy          7/9/2025   Pre-Op Questionnaire   Have you ever had a heart attack or stroke? No   Have you ever had surgery on your heart or blood vessels, such as a stent placement, a coronary artery bypass, or surgery on an artery in your head, neck, heart, or legs? No   Do you have chest pain with activity? No   Do you have a history of heart failure? No   Do you currently have a cold, bronchitis or symptoms of other infection? No   Do you have a cough, shortness of breath, or  wheezing? No   Do you or anyone in your family have previous history of blood clots? No   Do you or does anyone in your family have a serious bleeding problem such as prolonged bleeding following surgeries or cuts? No   Have you ever had problems with anemia or been told to take iron pills? No   Have you had any abnormal blood loss such as black, tarry or bloody stools? No   Have you ever had a blood transfusion? No   Are you willing to have a blood transfusion if it is medically needed before, during, or after your surgery? Yes   Have you or any of your relatives ever had problems with anesthesia? No   Do you have sleep apnea, excessive snoring or daytime drowsiness? No   Do you have any artifical heart valves or other implanted medical devices like a pacemaker, defibrillator, or continuous glucose monitor? No   Do you have artificial joints? No   Are you allergic to latex? No     Advance Care Planning    Discussed advance care planning with patient; however, patient declined at this time.    Preoperative Review of    reviewed - no record of controlled substances prescribed.  {Review [place] for all patients per [facility]:404404}    Status of Chronic Conditions:  See problem list for active medical problems.  Problems all longstanding and stable, except as noted/documented.  See ROS for pertinent symptoms related to these conditions.    Patient Active Problem List    Diagnosis Date Noted     History of rhinoplasty 01/12/2022     Priority: Medium      No past medical history on file.  Past Surgical History:   Procedure Laterality Date     ENT SURGERY  2011     Current Outpatient Medications   Medication Sig Dispense Refill     indomethacin (INDOCIN) 50 MG capsule TAKE 1 CAPSULE (50 MG) BY MOUTH 2 TIMES DAILY (WITH MEALS). (Patient not taking: Reported on 7/10/2025) 20 capsule 0     indomethacin (INDOCIN) 50 MG capsule TAKE 1 CAPSULE (50 MG) BY MOUTH 2 TIMES DAILY (WITH MEALS) (Patient not taking:  "Reported on 7/10/2025) 10 capsule 0     methylPREDNISolone (MEDROL DOSEPAK) 4 MG tablet therapy pack Follow Package Directions (Patient not taking: Reported on 7/10/2025) 21 tablet 0     valACYclovir (VALTREX) 1000 mg tablet Take 2 tablets (2,000 mg) by mouth 2 times daily for 1 day. At first sign of outbreak. (Patient not taking: Reported on 7/10/2025) 4 tablet 0       No Known Allergies     Social History     Tobacco Use     Smoking status: Never     Smokeless tobacco: Never   Substance Use Topics     Alcohol use: Yes       History   Drug Use Unknown           Feeling well, no acute concerns today.  Denies CP, palpitations, edema, dyspnea, HA, vision changes, changes in bowel or bladder habits.     Objective    /86   Pulse 89   Temp 98  F (36.7  C) (Skin)   Resp 17   Ht 1.753 m (5' 9.02\")   Wt 86.7 kg (191 lb 3.2 oz)   SpO2 99%   BMI 28.22 kg/m     Estimated body mass index is 28.22 kg/m  as calculated from the following:    Height as of this encounter: 1.753 m (5' 9.02\").    Weight as of this encounter: 86.7 kg (191 lb 3.2 oz).  Physical Exam  Vitals and nursing note reviewed.   Constitutional:       Appearance: He is well-developed.   HENT:      Head: Normocephalic and atraumatic.      Right Ear: Tympanic membrane, ear canal and external ear normal.      Left Ear: Tympanic membrane, ear canal and external ear normal.   Eyes:      Pupils: Pupils are equal, round, and reactive to light.   Neck:      Thyroid: No thyromegaly.   Cardiovascular:      Rate and Rhythm: Normal rate and regular rhythm.      Heart sounds: Normal heart sounds.   Pulmonary:      Effort: Pulmonary effort is normal.      Breath sounds: Normal breath sounds.   Abdominal:      General: Bowel sounds are normal.      Palpations: Abdomen is soft. There is no mass.   Musculoskeletal:         General: Normal range of motion.   Lymphadenopathy:      Cervical: No cervical adenopathy.   Skin:     General: Skin is warm and dry.      " "Findings: No rash.   Neurological:      Mental Status: He is alert and oriented to person, place, and time.   Psychiatric:         Judgment: Judgment normal.         No results for input(s): \"HGB\", \"PLT\", \"INR\", \"NA\", \"POTASSIUM\", \"CR\", \"A1C\" in the last 8760 hours.     Diagnostics  No labs were ordered during this visit.   No EKG required, no history of coronary heart disease, significant arrhythmia, peripheral arterial disease or other structural heart disease.    Revised Cardiac Risk Index (RCRI)  The patient has the following serious cardiovascular risks for perioperative complications:   - No serious cardiac risks = 0 points     RCRI Interpretation: 0 points: Class I (very low risk - 0.4% complication rate)         Signed Electronically by: Celestine Beckwith MD  A copy of this evaluation report is provided to the requesting physician.    {Provider Resources  Preop [facility] Preop Guidelines  Revised Cardiac Risk Index :286933}   {Email feedback regarding this note to [email or web address]   :005607}  "

## 2025-07-10 NOTE — PROGRESS NOTES
Prior to immunization administration, verified patients identity using patient s name and date of birth. Please see Immunization Activity for additional information.     Screening Questionnaire for Adult Immunization    Are you sick today?   No   Do you have allergies to medications, food, a vaccine component or latex?   No   Have you ever had a serious reaction after receiving a vaccination?   No   Do you have a long-term health problem with heart, lung, kidney, or metabolic disease (e.g., diabetes), asthma, a blood disorder, no spleen, complement component deficiency, a cochlear implant, or a spinal fluid leak?  Are you on long-term aspirin therapy?   No   Do you have cancer, leukemia, HIV/AIDS, or any other immune system problem?   No   Do you have a parent, brother, or sister with an immune system problem?   No   In the past 3 months, have you taken medications that affect  your immune system, such as prednisone, other steroids, or anticancer drugs; drugs for the treatment of rheumatoid arthritis, Crohn s disease, or psoriasis; or have you had radiation treatments?   No   Have you had a seizure, or a brain or other nervous system problem?   No   During the past year, have you received a transfusion of blood or blood    products, or been given immune (gamma) globulin or antiviral drug?   No   For women: Are you pregnant or is there a chance you could become       pregnant during the next month?   No   Have you received any vaccinations in the past 4 weeks?   No     Immunization questionnaire answers were all negative.      Patient instructed to remain in clinic for 15 minutes afterwards, and to report any adverse reactions.     Screening performed by Grazyna Cruz MA on 7/10/2025 at 9:37 AM.

## 2025-07-31 ENCOUNTER — DOCUMENTATION ONLY (OUTPATIENT)
Dept: OTHER | Facility: CLINIC | Age: 35
End: 2025-07-31
Payer: COMMERCIAL

## 2025-07-31 DIAGNOSIS — M93.1 KIENBOCK DISEASE OF LUNATE BONE OF RIGHT WRIST IN ADULT: Primary | ICD-10-CM

## 2025-07-31 RX ORDER — OXYCODONE HYDROCHLORIDE 5 MG/1
5 TABLET ORAL EVERY 6 HOURS PRN
Qty: 10 TABLET | Refills: 0 | Status: SHIPPED | OUTPATIENT
Start: 2025-07-31

## 2025-07-31 NOTE — PROGRESS NOTES
Operative Report     Date of Procedure: 2025     Location: [facility]     Name:Kannan Grijalva  : 1990  MRN: [id number]       Preoperative Diagnosis: Right Wrist Kienböck's Disease     Postoperative Diagnosis: Same       Procedure(s) Performed: Radial shortening osteotomy    Implants: Synthes 2.4 VA volar locking plate    Surgeon(s): Vern Kiran MD-Primary     Procedure Summary  Anesthesia: MAC with regional block  Estimated Blood Loss: 10 cc cc  Total IV Fluids: see anesthesia record mL       Procedural Indications: This is a 34 year old male  who is having surgery for right wrist Kiebock's disease.  We have attempted several months of immobilization without signficant improvements in pain or radiographic parameters. I explained to him that collapse of the lunate can lead to significant functional disability. We discussed radial shortening osteotomy with the goal to prevent collapse of the lunate. It was explained to the patient that the risks of surgery include but are not limited to infection, bleeding, damage to surrounding neurovascular structures, stiffness, need for further surgery, malunion, nonunion.  The patient was offered the opportunity to ask questions and wished to proceed.         Procedure Details: The patient was seen in the preoperative area.  Again the risks, benefits, complications, treatment options, non-operative alternatives, expected recovery and outcomes were discussed with the patient. The possibilities of reaction to medication, pulmonary aspiration, injury to surrounding structures, bleeding, recurrent infection, the need for additional procedures, failure to diagnose a condition, and creating a complication requiring transfusion or operation were again discussed with the patient. The patient concurred with the proposed plan, giving informed consent.  The site of surgery was marked with my initials. The patient was actively warmed in preoperative area. Preoperative  antibiotics were given within 1 hours of incision. Mechanical venous thrombosis prophylaxis including bilateral sequential compression devices were placed.     The patient was brought into the operating room and placed on the operating room table in the supine position with the operative extremity on a hand table.  Bony prominences were padded.  Following adequate anesthesia, the right upper extremity was prepped and draped in sterile fashion.  A timeout was performed confirming the patient, procedure, and laterality with the signed surgical consent and the imaging of the pathology.  All in attendance were in agreement.  The limb was exsanguinated using an Esmarch bandage and a proximal arm tourniquet was elevated to 230 mmHg.      I began with an incision over the volar wrist just radial to the tendon of the flexor carpi radialis.  Skin and subcutaneous tissues were incised with scalpel and blunt dissection was carried down to the level of the flexor carpi radialis tendon.  The sheath of the tendon was opened proximally and distally.  The tendon was retracted ulnarly and the subsheath was incised proximally and distally.  This brought us to the level of the flexor pollicis longus tendon which was retracted ulnarly.  We next encountered the pronator quadratus. The pronator was elevated from the radius sharply, leaving a cuff for later repair. The tendon of the brachial radialis as well as the first dorsal compartment tendons were identified.    A Synthes 4 hole 2.4 variable angle volar locking plate was selected and placed on the radius.  It was pinned to appropriate position and checked on fluoroscopy.  The position of the plate was found to be excellent.  2 distal locking screws were drilled and filled appropriately. The proximal oblong screw was drilled and the most proximal portion and filled with the appropriate size screw.  The position  and alignment of the plate and the length of all screws was checked on  x-ray and found to be excellent.  All the hardware was subsequently removed.  Next, a 3 mm metaphyseal osteotomy was planned.  3 mm was marked on the radius and resected with a sagittal saw.  Next, the plate was replaced and compression was achieved.  The proximal screws were drilled in an eccentric position, allowing for further compression.    The remaining 2 distal screw holes were drilled and were filled with locking screws.     The completed construct was checked on fluoroscopy. The radius was approximaly level with the ulna, the hardware was in good position, and was of appropriate length.      The volar cortex had excellent apposition but there appeared to be a gap dorsally.  Therefore, the wafer of resected bone was placed into the dorsal gap.    The wound was copiously irrigated.    The portion of the pronator was repaired with 3-0 Monocryl suture.    Prior to closure the DRUJ was examined in pronation, neutral, supination.  The DRUJ was stable in all positions. The wrist was taken through range of motion and it was noted to be full without crepitus.    The tourniquet was deflated and meticulous hemostasis was achieved with bipolar electrocautery.  The subcutaneous tissues were approximated with 3-0 Monocryl suture and the skin was closed with 4-0 Monocryl suture in a running fashion.    All sponge and sharp counts were correct at the end of the case.     A sterile dressing and volar plaster splint were applied.    The patient was taken to the PACU in stable condition.       Complications:  None; patient tolerated the procedure well.     Disposition: PACU - hemodynamically stable.  Condition: stable

## 2025-07-31 NOTE — PROGRESS NOTES
Postoperative Instructions - BONE FIXATION PROTOCOL  Dr. Vern Kiran    Care of incision:    Keep bandages and splint clean and dry.  When you shower, wrap the hand and elbow in a plastic bag.  Use tape to seal off water from leaking in.  Shower with your arm above shoulder level.      Care of swelling:    Keep your operative hand elevated: hand above elbow, elbow above the heart. If you had regional anesthesia (a nerve block), wear the sling until the block wears off. You can use the sling when you are walking around, but keep you non-immobilized joints (elbow and shoulder) moving every 2-3 hours.  Sleep on your back with your hand and elbow on top of your body on a pillow.  Apply ice in a ziplock ba min on and 20 min off to those parts that are least covered with bandages.  Move the fingers that are not immobilized as much as you can to prevent scarring of nerves and tendons and to decrease swelling.  Keeping swelling to a minimum will help you control pain and will allow you to move your fingers easier.      Care of pain:      Do not play a  catch up  game with pain. If you had regional anesthesia (a nerve block), begin the following protocol before the block has completely worn off.  Take Tylenol and Ibuprofen (Motrin/Advil) or an equivalent around the clock for the first 1-3 days.  It is much easier to anticipate pain and treat it, rather than treat it after it has appeared. Take Tylenol 650mg every 6 hours (never more than 3000mg in one day). If you do not have kidney or stomach disease, take Ibuprofen (Motrin/Advil) 400mg every 6 hours. Alternate and stagger these medications so you are taking something every 3 hours. For example, take tylenol then three hours later take ibuprofen. Three hours after that, take tylenol again and repeat around the clock except while sleeping.      If prescribed, take the opioid medication every 4-6 hours as needed, if needed. Do not drink alcohol, drink, or operate heavy  machinery while taking any opioid pain medication.     Things to watch out for:    Complications from these procedures are rare, but they do happen.  If you have any unusual pain, fever, redness, drainage from the wound, bleeding, bluishness of the fingers, other than normal mild bruising of skin, do not wait until your first post-operative visit - make a phone call to our office and speak to a professional as soon as you suspect that something is wrong.  WE ARE ALWAYS AVAILABLE TO ANSWER YOUR QUESTIONS. It is common to experience bruising or discoloration in other parts of the hand, wrist, forearm, and elbow after surgery.     First postoperative visit:    Your first postoperative visit should be on within 14 days following surgery, unless otherwise specified.  Please call the office, where you were initially seen, to make an appointment.  Your postoperative instructions, need for therapy, care of the operated extremity will be discussed in detail with you.      First year:    Expect your condition to improve for about 1 year after the surgery.  There is usually significant improvement in the first 2 months, and then a slow, steady improvement for the rest of the year.  Protect the scar from sunlight with SPF 50 for 1 year, so that it does not develop a permanent  sunburn  appearance. After the skin has healed, you may begin to put Vitamin E ointment on the scar to help minimize the appearance.

## 2025-08-13 ENCOUNTER — ANCILLARY PROCEDURE (OUTPATIENT)
Dept: GENERAL RADIOLOGY | Facility: CLINIC | Age: 35
End: 2025-08-13
Attending: STUDENT IN AN ORGANIZED HEALTH CARE EDUCATION/TRAINING PROGRAM
Payer: COMMERCIAL

## 2025-08-13 ENCOUNTER — THERAPY VISIT (OUTPATIENT)
Dept: OCCUPATIONAL THERAPY | Facility: CLINIC | Age: 35
End: 2025-08-13
Payer: COMMERCIAL

## 2025-08-13 ENCOUNTER — OFFICE VISIT (OUTPATIENT)
Dept: ORTHOPEDICS | Facility: CLINIC | Age: 35
End: 2025-08-13
Payer: COMMERCIAL

## 2025-08-13 DIAGNOSIS — Z98.890 STATUS POST OSTEOTOMY: ICD-10-CM

## 2025-08-13 DIAGNOSIS — M25.631 STIFFNESS OF RIGHT WRIST JOINT: Primary | ICD-10-CM

## 2025-08-13 DIAGNOSIS — M25.531 RIGHT WRIST PAIN: ICD-10-CM

## 2025-08-13 DIAGNOSIS — M93.1 KIENBOCK DISEASE OF LUNATE BONE OF RIGHT WRIST IN ADULT: ICD-10-CM

## 2025-08-13 DIAGNOSIS — Z98.890 STATUS POST OSTEOTOMY: Primary | ICD-10-CM

## 2025-08-13 PROCEDURE — 73110 X-RAY EXAM OF WRIST: CPT | Mod: TC | Performed by: INTERNAL MEDICINE

## 2025-08-13 PROCEDURE — 99024 POSTOP FOLLOW-UP VISIT: CPT | Performed by: STUDENT IN AN ORGANIZED HEALTH CARE EDUCATION/TRAINING PROGRAM

## 2025-08-13 PROCEDURE — 73090 X-RAY EXAM OF FOREARM: CPT | Mod: TC | Performed by: INTERNAL MEDICINE

## 2025-08-13 PROCEDURE — 97760 ORTHOTIC MGMT&TRAING 1ST ENC: CPT | Mod: GO | Performed by: OCCUPATIONAL THERAPIST

## 2025-08-13 PROCEDURE — 97165 OT EVAL LOW COMPLEX 30 MIN: CPT | Mod: GO | Performed by: OCCUPATIONAL THERAPIST

## 2025-08-13 PROCEDURE — 97110 THERAPEUTIC EXERCISES: CPT | Mod: GO | Performed by: OCCUPATIONAL THERAPIST
